# Patient Record
Sex: FEMALE | Race: WHITE | NOT HISPANIC OR LATINO | Employment: OTHER | URBAN - METROPOLITAN AREA
[De-identification: names, ages, dates, MRNs, and addresses within clinical notes are randomized per-mention and may not be internally consistent; named-entity substitution may affect disease eponyms.]

---

## 2017-01-04 ENCOUNTER — COMMUNICATION - HEALTHEAST (OUTPATIENT)
Dept: FAMILY MEDICINE | Facility: CLINIC | Age: 77
End: 2017-01-04

## 2017-01-04 DIAGNOSIS — I10 ESSENTIAL HYPERTENSION: ICD-10-CM

## 2017-02-03 ENCOUNTER — OFFICE VISIT - HEALTHEAST (OUTPATIENT)
Dept: FAMILY MEDICINE | Facility: CLINIC | Age: 77
End: 2017-02-03

## 2017-02-03 DIAGNOSIS — I10 ESSENTIAL HYPERTENSION WITH GOAL BLOOD PRESSURE LESS THAN 130/80: ICD-10-CM

## 2017-02-03 DIAGNOSIS — E11.9 TYPE 2 DIABETES MELLITUS (H): ICD-10-CM

## 2017-02-03 DIAGNOSIS — E78.5 HYPERLIPIDEMIA, UNSPECIFIED HYPERLIPIDEMIA TYPE: ICD-10-CM

## 2017-02-03 DIAGNOSIS — E66.09 NON MORBID OBESITY DUE TO EXCESS CALORIES: ICD-10-CM

## 2017-02-03 LAB
CHOLEST SERPL-MCNC: 152 MG/DL
FASTING STATUS PATIENT QL REPORTED: YES
HBA1C MFR BLD: 6.9 % (ref 3.5–6)
HDLC SERPL-MCNC: 36 MG/DL
LDLC SERPL CALC-MCNC: 57 MG/DL
TRIGL SERPL-MCNC: 296 MG/DL

## 2017-02-05 ENCOUNTER — AMBULATORY - HEALTHEAST (OUTPATIENT)
Dept: FAMILY MEDICINE | Facility: CLINIC | Age: 77
End: 2017-02-05

## 2017-02-05 ENCOUNTER — COMMUNICATION - HEALTHEAST (OUTPATIENT)
Dept: FAMILY MEDICINE | Facility: CLINIC | Age: 77
End: 2017-02-05

## 2017-02-05 DIAGNOSIS — E78.5 HYPERLIPIDEMIA, UNSPECIFIED HYPERLIPIDEMIA TYPE: ICD-10-CM

## 2017-02-08 ENCOUNTER — COMMUNICATION - HEALTHEAST (OUTPATIENT)
Dept: FAMILY MEDICINE | Facility: CLINIC | Age: 77
End: 2017-02-08

## 2017-05-04 ENCOUNTER — COMMUNICATION - HEALTHEAST (OUTPATIENT)
Dept: FAMILY MEDICINE | Facility: CLINIC | Age: 77
End: 2017-05-04

## 2017-05-04 DIAGNOSIS — E11.9 TYPE 2 DIABETES MELLITUS (H): ICD-10-CM

## 2017-08-03 ENCOUNTER — COMMUNICATION - HEALTHEAST (OUTPATIENT)
Dept: FAMILY MEDICINE | Facility: CLINIC | Age: 77
End: 2017-08-03

## 2017-08-03 DIAGNOSIS — E78.5 HYPERLIPIDEMIA, UNSPECIFIED HYPERLIPIDEMIA TYPE: ICD-10-CM

## 2017-08-03 RX ORDER — ATORVASTATIN CALCIUM 40 MG/1
TABLET, FILM COATED ORAL
Qty: 90 TABLET | Refills: 1 | Status: SHIPPED | OUTPATIENT
Start: 2017-08-03

## 2017-08-19 ENCOUNTER — COMMUNICATION - HEALTHEAST (OUTPATIENT)
Dept: FAMILY MEDICINE | Facility: CLINIC | Age: 77
End: 2017-08-19

## 2017-08-19 DIAGNOSIS — E11.9 TYPE 2 DIABETES MELLITUS (H): ICD-10-CM

## 2017-09-12 ENCOUNTER — RECORDS - HEALTHEAST (OUTPATIENT)
Dept: ADMINISTRATIVE | Facility: OTHER | Age: 77
End: 2017-09-12

## 2017-09-28 ENCOUNTER — COMMUNICATION - HEALTHEAST (OUTPATIENT)
Dept: FAMILY MEDICINE | Facility: CLINIC | Age: 77
End: 2017-09-28

## 2017-09-28 DIAGNOSIS — I10 ESSENTIAL HYPERTENSION: ICD-10-CM

## 2018-03-23 ENCOUNTER — COMMUNICATION - HEALTHEAST (OUTPATIENT)
Dept: FAMILY MEDICINE | Facility: CLINIC | Age: 78
End: 2018-03-23

## 2018-03-23 DIAGNOSIS — I10 ESSENTIAL HYPERTENSION: ICD-10-CM

## 2018-03-24 RX ORDER — LISINOPRIL 5 MG/1
TABLET ORAL
Qty: 90 TABLET | Refills: 0 | Status: SHIPPED | OUTPATIENT
Start: 2018-03-24 | End: 2024-01-29

## 2018-03-26 ENCOUNTER — COMMUNICATION - HEALTHEAST (OUTPATIENT)
Dept: FAMILY MEDICINE | Facility: CLINIC | Age: 78
End: 2018-03-26

## 2018-12-03 ENCOUNTER — COMMUNICATION - HEALTHEAST (OUTPATIENT)
Dept: FAMILY MEDICINE | Facility: CLINIC | Age: 78
End: 2018-12-03

## 2021-05-29 ENCOUNTER — RECORDS - HEALTHEAST (OUTPATIENT)
Dept: ADMINISTRATIVE | Facility: CLINIC | Age: 81
End: 2021-05-29

## 2021-05-30 VITALS — WEIGHT: 240 LBS | BODY MASS INDEX: 40.56 KG/M2

## 2021-06-08 NOTE — PROGRESS NOTES
Subjective:    Maritza Thomas is seen today for follow-up evaluation type 2 diabetes.  States can only be seen twice a year due to insurance coverage.  Continues metformin 500 mg using 2 capsules twice daily.  Lisinopril 5 mg daily for hypertension and renal protective effects.  Atorvastatin 20 mg daily for lipid management.  Needs refills.  Immunizations reviewed and up-to-date.  Fasting today.  Denies chest pain or shortness of breath.  No palpitations.  Past medical social and family history reviewed and updated as noted below.    /   5 children   Retired: worked as an  at [employer]..   Tobacco: none.   EtOH: occ beer or wine  Drugs: None   PMH: HTN. Allergic rhinitis. OA R shoulder  PSH: s/p appy; bilateral cataract repair (9-23-15 and 10-7-15)  FBS of 129 on July 24, 2009. Prior Hgb A1c = 6.2%. 07/26/2011   MAIL COLONSCOPY LETTER TO PATIENT/[facility]   Thyroid on CXR   Patient did not tolerate steroid nasal spray well    Past Surgical History:   Procedure Laterality Date     APPENDECTOMY          History reviewed. No pertinent family history.     Past Medical History:   Diagnosis Date     Diabetes mellitus      Hypertension         Social History   Substance Use Topics     Smoking status: Former Smoker     Smokeless tobacco: Never Used     Alcohol use 0.5 oz/week     1 drink(s) per week        Current Outpatient Prescriptions   Medication Sig Dispense Refill     aspirin 81 MG EC tablet Take 1 tablet (81 mg total) by mouth daily. 90 tablet 3     atorvastatin (LIPITOR) 20 MG tablet TAKE 1 TABLET (20 MG TOTAL) BY MOUTH BEDTIME. 90 tablet 3     lisinopril (PRINIVIL,ZESTRIL) 5 MG tablet TAKE 1 TABLET (5 MG) BY ORAL ROUTE ONCE DAILY 90 tablet 2     metFORMIN (GLUCOPHAGE) 500 MG tablet TAKE 2 TABLETS BY MOUTH 2 TIMES DAILY WITH MEALS. 180 tablet 2     PROAIR HFA 90 mcg/actuation inhaler INHALE TWO PUFFS BY MOUTH EVERY 4 TO 6 HOURS AS NEEDED - MAX OF 12 PUFFS IN 24 HOURS 8.5 g 5     No  current facility-administered medications for this visit.           Objective:    Vitals:    02/03/17 1321   BP: 130/72   Pulse: 80   Weight: (!) 240 lb (108.9 kg)      Body mass index is 40.56 kg/(m^2).    Alert.  No apparent distress.  Chest clear.  Cardiac exam regular.  Extremities warm and dry.  Monofilament testing normal.      Assessment:    1. Type 2 diabetes mellitus  Glycosylated Hemoglobin A1c    Glycosylated Hemoglobin A1c    Microalbumin, Random Urine   2. Essential hypertension with goal blood pressure less than 130/80  Basic Metabolic Panel   3. Hyperlipidemia, unspecified hyperlipidemia type  Lipid Cascade    atorvastatin (LIPITOR) 20 MG tablet   4. Non morbid obesity due to excess calories           Plan:    Ensure annual diabetic eye exam.  A1c increased from 6.1% to 6.9%.  Dietary and exercise modifications for [place] less than 200 pounds initially.  Check basic metabolic panel to ensure stable renal function.  Lipid cascade obtained while on atorvastatin 20 mg daily with refill provided.  Check microalbumin screen.  Patient elects follow-up in 6 months.  Immunizations reviewed and up-to-date.

## 2024-01-29 ENCOUNTER — HOSPITAL ENCOUNTER (INPATIENT)
Facility: CLINIC | Age: 84
LOS: 7 days | Discharge: HOME | DRG: 308 | End: 2024-02-05
Attending: EMERGENCY MEDICINE | Admitting: STUDENT IN AN ORGANIZED HEALTH CARE EDUCATION/TRAINING PROGRAM
Payer: COMMERCIAL

## 2024-01-29 ENCOUNTER — APPOINTMENT (OUTPATIENT)
Dept: CT IMAGING | Facility: CLINIC | Age: 84
DRG: 308 | End: 2024-01-29
Attending: EMERGENCY MEDICINE
Payer: COMMERCIAL

## 2024-01-29 DIAGNOSIS — I27.20 PULMONARY HYPERTENSION (H): ICD-10-CM

## 2024-01-29 DIAGNOSIS — I35.0 NONRHEUMATIC AORTIC VALVE STENOSIS: Primary | ICD-10-CM

## 2024-01-29 DIAGNOSIS — I47.10 PAROXYSMAL SVT (SUPRAVENTRICULAR TACHYCARDIA) (H): ICD-10-CM

## 2024-01-29 DIAGNOSIS — I48.91 ATRIAL FIBRILLATION WITH RVR (H): ICD-10-CM

## 2024-01-29 DIAGNOSIS — J81.0 ACUTE PULMONARY EDEMA (H): ICD-10-CM

## 2024-01-29 DIAGNOSIS — J96.01 ACUTE HYPOXEMIC RESPIRATORY FAILURE (H): ICD-10-CM

## 2024-01-29 PROBLEM — J45.20 MILD INTERMITTENT ASTHMA WITHOUT COMPLICATION: Status: ACTIVE | Noted: 2019-05-01

## 2024-01-29 PROBLEM — I47.19 MULTIFOCAL ATRIAL TACHYCARDIA (H): Status: ACTIVE | Noted: 2023-06-20

## 2024-01-29 PROBLEM — R01.1 HEART MURMUR: Status: ACTIVE | Noted: 2022-12-03

## 2024-01-29 LAB
ANION GAP SERPL CALCULATED.3IONS-SCNC: 14 MMOL/L (ref 7–15)
ATRIAL RATE - MUSE: 166 BPM
BUN SERPL-MCNC: 13 MG/DL (ref 8–23)
CALCIUM SERPL-MCNC: 9.8 MG/DL (ref 8.8–10.2)
CHLORIDE SERPL-SCNC: 103 MMOL/L (ref 98–107)
CREAT SERPL-MCNC: 0.73 MG/DL (ref 0.51–0.95)
CREAT SERPL-MCNC: 0.76 MG/DL (ref 0.51–0.95)
DEPRECATED HCO3 PLAS-SCNC: 24 MMOL/L (ref 22–29)
DIASTOLIC BLOOD PRESSURE - MUSE: 100 MMHG
EGFRCR SERPLBLD CKD-EPI 2021: 76 ML/MIN/1.73M2
EGFRCR SERPLBLD CKD-EPI 2021: 80 ML/MIN/1.73M2
ERYTHROCYTE [DISTWIDTH] IN BLOOD BY AUTOMATED COUNT: 14.8 % (ref 10–15)
FLUAV RNA SPEC QL NAA+PROBE: NEGATIVE
FLUBV RNA RESP QL NAA+PROBE: NEGATIVE
GLUCOSE SERPL-MCNC: 208 MG/DL (ref 70–99)
HBA1C MFR BLD: 7.3 %
HCT VFR BLD AUTO: 37.1 % (ref 35–47)
HGB BLD-MCNC: 11.7 G/DL (ref 11.7–15.7)
INR PPP: 1.22 (ref 0.85–1.15)
INTERPRETATION ECG - MUSE: NORMAL
MAGNESIUM SERPL-MCNC: 1.8 MG/DL (ref 1.7–2.3)
MCH RBC QN AUTO: 30.2 PG (ref 26.5–33)
MCHC RBC AUTO-ENTMCNC: 31.5 G/DL (ref 31.5–36.5)
MCV RBC AUTO: 96 FL (ref 78–100)
NT-PROBNP SERPL-MCNC: 1076 PG/ML (ref 0–1800)
P AXIS - MUSE: NORMAL DEGREES
PLATELET # BLD AUTO: 177 10E3/UL (ref 150–450)
POTASSIUM SERPL-SCNC: 4.2 MMOL/L (ref 3.4–5.3)
PR INTERVAL - MUSE: NORMAL MS
QRS DURATION - MUSE: 70 MS
QT - MUSE: 250 MS
QTC - MUSE: 426 MS
R AXIS - MUSE: 1 DEGREES
RBC # BLD AUTO: 3.87 10E6/UL (ref 3.8–5.2)
RSV RNA SPEC NAA+PROBE: NEGATIVE
SARS-COV-2 RNA RESP QL NAA+PROBE: NEGATIVE
SODIUM SERPL-SCNC: 141 MMOL/L (ref 135–145)
SYSTOLIC BLOOD PRESSURE - MUSE: 173 MMHG
T AXIS - MUSE: 94 DEGREES
TROPONIN T SERPL HS-MCNC: 22 NG/L
TROPONIN T SERPL HS-MCNC: 24 NG/L
TSH SERPL DL<=0.005 MIU/L-ACNC: 1 UIU/ML (ref 0.3–4.2)
VENTRICULAR RATE- MUSE: 175 BPM
WBC # BLD AUTO: 7.8 10E3/UL (ref 4–11)

## 2024-01-29 PROCEDURE — 96376 TX/PRO/DX INJ SAME DRUG ADON: CPT

## 2024-01-29 PROCEDURE — 84484 ASSAY OF TROPONIN QUANT: CPT | Performed by: EMERGENCY MEDICINE

## 2024-01-29 PROCEDURE — 36415 COLL VENOUS BLD VENIPUNCTURE: CPT | Performed by: EMERGENCY MEDICINE

## 2024-01-29 PROCEDURE — 96365 THER/PROPH/DIAG IV INF INIT: CPT | Mod: 59

## 2024-01-29 PROCEDURE — 250N000011 HC RX IP 250 OP 636

## 2024-01-29 PROCEDURE — 82565 ASSAY OF CREATININE: CPT

## 2024-01-29 PROCEDURE — 83735 ASSAY OF MAGNESIUM: CPT | Performed by: EMERGENCY MEDICINE

## 2024-01-29 PROCEDURE — 83036 HEMOGLOBIN GLYCOSYLATED A1C: CPT

## 2024-01-29 PROCEDURE — 85610 PROTHROMBIN TIME: CPT | Performed by: EMERGENCY MEDICINE

## 2024-01-29 PROCEDURE — 71275 CT ANGIOGRAPHY CHEST: CPT

## 2024-01-29 PROCEDURE — 85027 COMPLETE CBC AUTOMATED: CPT | Performed by: EMERGENCY MEDICINE

## 2024-01-29 PROCEDURE — 83880 ASSAY OF NATRIURETIC PEPTIDE: CPT | Performed by: EMERGENCY MEDICINE

## 2024-01-29 PROCEDURE — 250N000013 HC RX MED GY IP 250 OP 250 PS 637

## 2024-01-29 PROCEDURE — 87637 SARSCOV2&INF A&B&RSV AMP PRB: CPT | Performed by: EMERGENCY MEDICINE

## 2024-01-29 PROCEDURE — 93005 ELECTROCARDIOGRAM TRACING: CPT | Performed by: EMERGENCY MEDICINE

## 2024-01-29 PROCEDURE — 84443 ASSAY THYROID STIM HORMONE: CPT | Performed by: EMERGENCY MEDICINE

## 2024-01-29 PROCEDURE — 250N000011 HC RX IP 250 OP 636: Performed by: EMERGENCY MEDICINE

## 2024-01-29 PROCEDURE — 80048 BASIC METABOLIC PNL TOTAL CA: CPT | Performed by: EMERGENCY MEDICINE

## 2024-01-29 PROCEDURE — 210N000002 HC R&B HEART CARE

## 2024-01-29 PROCEDURE — 99291 CRITICAL CARE FIRST HOUR: CPT | Mod: 25

## 2024-01-29 RX ORDER — MULTIVIT WITH MINERALS/LUTEIN
1 TABLET ORAL DAILY
COMMUNITY

## 2024-01-29 RX ORDER — VITAMIN B COMPLEX
25 TABLET ORAL DAILY
COMMUNITY

## 2024-01-29 RX ORDER — CALCIUM CARBONATE 500 MG/1
1000 TABLET, CHEWABLE ORAL 4 TIMES DAILY PRN
Status: DISCONTINUED | OUTPATIENT
Start: 2024-01-29 | End: 2024-02-05 | Stop reason: HOSPADM

## 2024-01-29 RX ORDER — FUROSEMIDE 10 MG/ML
40 INJECTION INTRAMUSCULAR; INTRAVENOUS ONCE
Status: COMPLETED | OUTPATIENT
Start: 2024-01-29 | End: 2024-01-29

## 2024-01-29 RX ORDER — AMOXICILLIN 250 MG
2 CAPSULE ORAL 2 TIMES DAILY PRN
Status: DISCONTINUED | OUTPATIENT
Start: 2024-01-29 | End: 2024-02-05 | Stop reason: HOSPADM

## 2024-01-29 RX ORDER — FUROSEMIDE 10 MG/ML
40 INJECTION INTRAMUSCULAR; INTRAVENOUS ONCE
Status: DISCONTINUED | OUTPATIENT
Start: 2024-01-29 | End: 2024-01-29

## 2024-01-29 RX ORDER — AMOXICILLIN 250 MG
1 CAPSULE ORAL 2 TIMES DAILY PRN
Status: DISCONTINUED | OUTPATIENT
Start: 2024-01-29 | End: 2024-02-05 | Stop reason: HOSPADM

## 2024-01-29 RX ORDER — DILTIAZEM HYDROCHLORIDE 5 MG/ML
15 INJECTION INTRAVENOUS ONCE
Status: COMPLETED | OUTPATIENT
Start: 2024-01-29 | End: 2024-01-29

## 2024-01-29 RX ORDER — FLUTICASONE PROPIONATE 50 MCG
1 SPRAY, SUSPENSION (ML) NASAL 2 TIMES DAILY
COMMUNITY

## 2024-01-29 RX ORDER — IOPAMIDOL 755 MG/ML
90 INJECTION, SOLUTION INTRAVASCULAR ONCE
Status: COMPLETED | OUTPATIENT
Start: 2024-01-29 | End: 2024-01-29

## 2024-01-29 RX ORDER — METFORMIN HCL 500 MG
500 TABLET, EXTENDED RELEASE 24 HR ORAL 2 TIMES DAILY WITH MEALS
COMMUNITY

## 2024-01-29 RX ORDER — DILTIAZEM HYDROCHLORIDE 5 MG/ML
25 INJECTION INTRAVENOUS ONCE
Status: COMPLETED | OUTPATIENT
Start: 2024-01-29 | End: 2024-01-29

## 2024-01-29 RX ORDER — METFORMIN HCL 500 MG
500 TABLET, EXTENDED RELEASE 24 HR ORAL 2 TIMES DAILY WITH MEALS
Status: DISCONTINUED | OUTPATIENT
Start: 2024-01-29 | End: 2024-02-05 | Stop reason: HOSPADM

## 2024-01-29 RX ORDER — LIDOCAINE 40 MG/G
CREAM TOPICAL
Status: DISCONTINUED | OUTPATIENT
Start: 2024-01-29 | End: 2024-02-05 | Stop reason: HOSPADM

## 2024-01-29 RX ORDER — FUROSEMIDE 10 MG/ML
40 INJECTION INTRAMUSCULAR; INTRAVENOUS ONCE
Status: COMPLETED | OUTPATIENT
Start: 2024-01-30 | End: 2024-01-30

## 2024-01-29 RX ORDER — ACETAMINOPHEN 650 MG/1
650 SUPPOSITORY RECTAL EVERY 4 HOURS PRN
Status: DISCONTINUED | OUTPATIENT
Start: 2024-01-29 | End: 2024-02-05 | Stop reason: HOSPADM

## 2024-01-29 RX ORDER — ACETAMINOPHEN 325 MG/1
650 TABLET ORAL EVERY 4 HOURS PRN
Status: DISCONTINUED | OUTPATIENT
Start: 2024-01-29 | End: 2024-02-05 | Stop reason: HOSPADM

## 2024-01-29 RX ORDER — ENOXAPARIN SODIUM 100 MG/ML
40 INJECTION SUBCUTANEOUS EVERY 24 HOURS
Status: DISCONTINUED | OUTPATIENT
Start: 2024-01-29 | End: 2024-01-29

## 2024-01-29 RX ORDER — DILTIAZEM HCL/D5W 125 MG/125
5-15 PLASTIC BAG, INJECTION (ML) INTRAVENOUS CONTINUOUS
Status: DISCONTINUED | OUTPATIENT
Start: 2024-01-29 | End: 2024-02-05

## 2024-01-29 RX ORDER — DILTIAZEM HYDROCHLORIDE 5 MG/ML
20 INJECTION INTRAVENOUS ONCE
Status: COMPLETED | OUTPATIENT
Start: 2024-01-29 | End: 2024-01-29

## 2024-01-29 RX ORDER — LOSARTAN POTASSIUM 50 MG/1
50 TABLET ORAL EVERY EVENING
COMMUNITY

## 2024-01-29 RX ORDER — ATORVASTATIN CALCIUM 40 MG/1
40 TABLET, FILM COATED ORAL EVERY EVENING
Status: DISCONTINUED | OUTPATIENT
Start: 2024-01-29 | End: 2024-02-05 | Stop reason: HOSPADM

## 2024-01-29 RX ORDER — UBIDECARENONE 75 MG
100 CAPSULE ORAL DAILY
COMMUNITY

## 2024-01-29 RX ORDER — UBIDECARENONE 75 MG
100 CAPSULE ORAL DAILY
Status: DISCONTINUED | OUTPATIENT
Start: 2024-01-30 | End: 2024-02-05 | Stop reason: HOSPADM

## 2024-01-29 RX ORDER — HEPARIN SODIUM 5000 [USP'U]/.5ML
5000 INJECTION, SOLUTION INTRAVENOUS; SUBCUTANEOUS EVERY 8 HOURS
Status: DISCONTINUED | OUTPATIENT
Start: 2024-01-30 | End: 2024-01-30

## 2024-01-29 RX ADMIN — METFORMIN ER 500 MG 500 MG: 500 TABLET ORAL at 19:36

## 2024-01-29 RX ADMIN — DILTIAZEM HYDROCHLORIDE 15 MG: 5 INJECTION INTRAVENOUS at 15:23

## 2024-01-29 RX ADMIN — FUROSEMIDE 40 MG: 10 INJECTION, SOLUTION INTRAMUSCULAR; INTRAVENOUS at 17:49

## 2024-01-29 RX ADMIN — Medication 5 MG/HR: at 16:32

## 2024-01-29 RX ADMIN — DILTIAZEM HYDROCHLORIDE 20 MG: 5 INJECTION INTRAVENOUS at 16:26

## 2024-01-29 RX ADMIN — ENOXAPARIN SODIUM 40 MG: 100 INJECTION SUBCUTANEOUS at 19:30

## 2024-01-29 RX ADMIN — DILTIAZEM HYDROCHLORIDE 25 MG: 5 INJECTION INTRAVENOUS at 18:30

## 2024-01-29 RX ADMIN — IOPAMIDOL 90 ML: 755 INJECTION, SOLUTION INTRAVENOUS at 16:11

## 2024-01-29 RX ADMIN — ATORVASTATIN CALCIUM 40 MG: 40 TABLET, FILM COATED ORAL at 19:30

## 2024-01-29 ASSESSMENT — ACTIVITIES OF DAILY LIVING (ADL)
ADLS_ACUITY_SCORE: 37
ADLS_ACUITY_SCORE: 35
ADLS_ACUITY_SCORE: 37

## 2024-01-29 NOTE — PHARMACY-ADMISSION MEDICATION HISTORY
Pharmacist Admission Medication History    Admission medication history is complete. The information provided in this note is only as accurate as the sources available at the time of the update.    Information Source(s): Patient and CareEverywhere/SureScripts via in-person    Pertinent Information: n/a    Changes made to PTA medication list:  Added: Centrum, Flonase, Vit D, Vit B12, losartan  Deleted: lisinopril  Changed: metformin from 1000mg BID    Allergies reviewed with patient and updates made in EHR: yes    Medication History Completed By: Cristina Melo PharmD 1/29/2024 4:24 PM    Prior to Admission medications    Medication Sig Last Dose Taking? Auth Provider Long Term End Date   atorvastatin (LIPITOR) 40 MG tablet [ATORVASTATIN (LIPITOR) 40 MG TABLET] TAKE ONE TABLET BY MOUTH DAILY AT BEDTIME 1/28/2024 at hs Yes Cristofer Ramirez MD Yes    cyanocobalamin (VITAMIN B-12) 100 MCG tablet Take 100 mcg by mouth daily 1/29/2024 at am Yes Unknown, Entered By History     fluticasone (FLONASE) 50 MCG/ACT nasal spray Spray 1 spray into both nostrils 2 times daily 1/29/2024 at am x1, not with Yes Unknown, Entered By History     losartan (COZAAR) 50 MG tablet Take 50 mg by mouth every evening 1/28/2024 at pm Yes Unknown, Entered By History Yes    metFORMIN (GLUCOPHAGE XR) 500 MG 24 hr tablet Take 500 mg by mouth 2 times daily (with meals) 1/29/2024 at am x1 Yes Unknown, Entered By History No    multivitamin (CENTRUM SILVER) tablet Take 1 tablet by mouth daily 1/29/2024 at am Yes Unknown, Entered By History     PROAIR HFA 90 mcg/actuation inhaler [PROAIR HFA 90 MCG/ACTUATION INHALER] INHALE TWO PUFFS BY MOUTH EVERY 4 TO 6 HOURS AS NEEDED - MAX OF 12 PUFFS IN 24 HOURS 1/29/2024 at 1300, has with Yes Cristofer Ramirez MD     Vitamin D3 (CHOLECALCIFEROL) 25 mcg (1000 units) tablet Take 25 mcg by mouth daily 1/29/2024 at am Yes Unknown, Entered By History

## 2024-01-29 NOTE — ED PROVIDER NOTES
EMERGENCY DEPARTMENT ENCOUNTER      NAME: Maritza Thomas  AGE: 83 year old female  YOB: 1940  MRN: [id number]  EVALUATION DATE & TIME: 1/29/2024  2:48 PM    PCP: Cristofer Ramirez    ED PROVIDER: Grace Scherer MD    Chief Complaint   Patient presents with    Shortness of Breath    Tachycardia         FINAL IMPRESSION:  1. Atrial fibrillation with RVR (H)    2. Acute pulmonary edema (H)    3. Acute hypoxemic respiratory failure (H)          ED COURSE & MEDICAL DECISION MAKING:    Pertinent Labs & Imaging studies reviewed. (See chart for details)  83 year old female with history of HTN, HLD, DM, asthma and paroxysmal SVT who presents to the Emergency Department for evaluation of shortness of breath.  Found to be in A-fib with RVR and hypoxic at clinic prior to arrival.  Differential includes new onset arrhythmia, symptomatic anemia, ACS, pneumonia, pulmonary embolism, new onset heart failure.    Patient placed on monitor, IV established and blood obtained.  Twelve-lead EKG shows A-fib with RVR.  Patient is tachycardic in the 180s.  Given 15 mg of diltiazem IV with improvement of rates into the 150s.  Given additional 20 mg diltiazem IV and placed on diltiazem drip.  COVID/influenza/RSV swab negative.  CBC and BMP notable for blood glucose of 208.  BNP normal at 1076.  Troponin elevated in indeterminate range at 24 but on repeat has down trended to 22.  INR is 1.2, again patient states that she is on Coumadin but I do not find record of this nor does her pharmacist and regardless her INR is subtherapeutic today so did proceed with CT PE study.  Negative for PE.  Shows enlargement of the pulmonary artery which could be seen with pulmonary hypertension.  Findings suggestive of pulmonary edema with right and left pleural effusions.  There is also a possible thrombus versus mixing artifact in the left atrial appendage as well as incidental thyroid goiter and cirrhotic appearing liver.  Given 40 mg of Lasix  IV as she appears to be diuretic naïve and will be admitted to medicine for further management      ED Course as of 01/29/24 2057 Mon Jan 29, 2024   1541 The 150s to 160s after 15 mg diltiazem   1545 INR(!): 1.22  Sub therapeutic    1555 Troponin T, High Sensitivity(!): 24   1555 Glucose(!): 208  Known DM   1619 CT Chest Pulmonary Embolism w Contrast  CT interpreted by myself.  No visualized PE but right-sided pleural effusion   1658 Admitted to resident service       Medical Decision Making    History:  Supplemental history from: Other: Physician who called ahead with patient information  External Record(s) reviewed: Outpatient Record: Clinic visit note today and Other: Previous EKG    Work Up:  Chart documentation includes differential considered and any EKGs or imaging independently interpreted by provider, see MDM  In additional to work up documented, I considered the following work up: see MDM    External consultation:  Discussion of management with another provider: Admitting physician    Complicating factors:  Care impacted by chronic illness: Chronic Lung Disease, Hyperlipidemia, and Hypertension  Care affected by social determinants of health: Access to Medical Care referred to ED    Disposition considerations: Admit.        At the conclusion of the encounter I discussed the results of all of the tests and the disposition. The questions were answered. The patient or family acknowledged understanding and was agreeable with the care plan.    CRITICAL CARE:  Critical Care  Performed by: Grace Scherer MD  Authorized by: Grace Scherer MD     Total critical care time: 67 minutes  Criticalcare time was exclusive of separately billable procedures and treating other patients.  Critical care was necessary to treat or prevent imminent or life-threatening deterioration of the following conditions: A-fib with RVR, acute hypoxic respiratory failure  Critical care was time spent personally by me on the following  activities: development of treatment plan with patient or surrogate, discussions with consultants, examination of patient, evaluation of patient's response totreatment, obtaining history from patient or surrogate, ordering and performing treatments and interventions, ordering and review of laboratory studies, ordering and review of radiographic studies and re-evaluation ofpatient's condition, this excludes any separately billable procedures.      MEDICATIONS GIVEN IN THE EMERGENCY:  Medications   diltiazem (CARDIZEM) 125 mg in dextrose 5 % 125 mL infusion (15 mg/hr Intravenous Rate/Dose Verify 1/29/24 2039)   lidocaine 1 % 0.1-1 mL (has no administration in time range)   lidocaine (LMX4) cream (has no administration in time range)   sodium chloride (PF) 0.9% PF flush 3 mL (3 mLs Intracatheter $Given 1/29/24 1930)   sodium chloride (PF) 0.9% PF flush 3 mL (has no administration in time range)   senna-docusate (SENOKOT-S/PERICOLACE) 8.6-50 MG per tablet 1 tablet (has no administration in time range)     Or   senna-docusate (SENOKOT-S/PERICOLACE) 8.6-50 MG per tablet 2 tablet (has no administration in time range)   calcium carbonate (TUMS) chewable tablet 1,000 mg (has no administration in time range)   acetaminophen (TYLENOL) tablet 650 mg (has no administration in time range)     Or   acetaminophen (TYLENOL) Suppository 650 mg (has no administration in time range)   atorvastatin (LIPITOR) tablet 40 mg (40 mg Oral $Given 1/29/24 1930)   cyanocobalamin (VITAMIN B-12) tablet 100 mcg (has no administration in time range)   metFORMIN (GLUCOPHAGE XR) 24 hr tablet 500 mg (500 mg Oral $Given 1/29/24 1936)   senna-docusate (SENOKOT-S/PERICOLACE) 8.6-50 MG per tablet 1 tablet (has no administration in time range)     Or   senna-docusate (SENOKOT-S/PERICOLACE) 8.6-50 MG per tablet 2 tablet (has no administration in time range)   calcium carbonate (TUMS) chewable tablet 1,000 mg (has no administration in time range)   heparin  "ANTICOAGULANT injection 5,000 Units (has no administration in time range)   diltiazem (CARDIZEM) injection 15 mg (15 mg Intravenous $Given 1/29/24 1523)   diltiazem (CARDIZEM) injection 20 mg (20 mg Intravenous $Given 1/29/24 1626)   iopamidol (ISOVUE-370) solution 90 mL (90 mLs Intravenous $Given 1/29/24 1611)   furosemide (LASIX) injection 40 mg (40 mg Intravenous $Given 1/29/24 1749)   diltiazem (CARDIZEM) injection 25 mg (25 mg Intravenous $Given 1/29/24 1830)       NEW PRESCRIPTIONS STARTED AT TODAY'S ER VISIT  New Prescriptions    No medications on file          =================================================================    HPI    Patient information was obtained from: Patient    Use of Intrepreter: N/A      Maritza Thomas is a 83 year old female with pertinent medical history of HTN, HLD, DM, asthma and paroxysmal SVT who presents shortness of breath.  She has been short of breath for the last several days but had an upcoming clinic appointment for diabetic check.  Today at clinic she was found to be in A-fib with RVR with heart rates in the 180s and was hypoxic and referred here.  Patient does not have any known history of A-fib.  She denies any sense of palpitations like her heart is racing or skipping beats.  No associated chest pain, patient attributes her dyspnea to a \"sinus infection\" stating that she has been having some postnasal drip and she will cough this up in the morning and then gets relief with no residual cough for the remainder of the day.  Denies any known history of heart failure, weight gain.  Patient initially tells me she is anticoagulated on Coumadin, but I do not see an indication for her anticoagulation status nor do I see this on her med list nor does the pharmacist.  She denies any history of DVT, PE.      PAST MEDICAL HISTORY:  No past medical history on file.    PAST SURGICAL HISTORY:  Past Surgical History:   Procedure Laterality Date    APPENDECTOMY         CURRENT " MEDICATIONS:    Prior to Admission Medications   Prescriptions Last Dose Informant Patient Reported? Taking?   PROAIR HFA 90 mcg/actuation inhaler 1/29/2024 at 1300, has with  No Yes   Sig: [PROAIR HFA 90 MCG/ACTUATION INHALER] INHALE TWO PUFFS BY MOUTH EVERY 4 TO 6 HOURS AS NEEDED - MAX OF 12 PUFFS IN 24 HOURS   Vitamin D3 (CHOLECALCIFEROL) 25 mcg (1000 units) tablet 1/29/2024 at am  Yes Yes   Sig: Take 25 mcg by mouth daily   atorvastatin (LIPITOR) 40 MG tablet 1/28/2024 at hs  No Yes   Sig: [ATORVASTATIN (LIPITOR) 40 MG TABLET] TAKE ONE TABLET BY MOUTH DAILY AT BEDTIME   cyanocobalamin (VITAMIN B-12) 100 MCG tablet 1/29/2024 at am  Yes Yes   Sig: Take 100 mcg by mouth daily   fluticasone (FLONASE) 50 MCG/ACT nasal spray 1/29/2024 at am x1, not with  Yes Yes   Sig: Spray 1 spray into both nostrils 2 times daily   losartan (COZAAR) 50 MG tablet 1/28/2024 at pm  Yes Yes   Sig: Take 50 mg by mouth every evening   metFORMIN (GLUCOPHAGE XR) 500 MG 24 hr tablet 1/29/2024 at am x1  Yes Yes   Sig: Take 500 mg by mouth 2 times daily (with meals)   multivitamin (CENTRUM SILVER) tablet 1/29/2024 at am  Yes Yes   Sig: Take 1 tablet by mouth daily      Facility-Administered Medications: None       ALLERGIES:  No Known Allergies    FAMILY HISTORY:  No family history on file.    SOCIAL HISTORY:  Social History     Tobacco Use    Smoking status: Former    Smokeless tobacco: Never   Substance Use Topics    Alcohol use: Yes     Alcohol/week: 0.8 standard drinks of alcohol    Drug use: No        VITALS:  Patient Vitals for the past 24 hrs:   BP Temp Temp src Pulse Resp SpO2 Height Weight   01/29/24 2000 137/64 97.9  F (36.6  C) -- 111 22 93 % -- --   01/29/24 1930 125/78 -- -- 115 (!) 31 90 % -- --   01/29/24 1915 122/69 98  F (36.7  C) Oral 114 26 94 % -- --   01/29/24 1910 -- -- -- -- -- (!) 88 % -- --   01/29/24 1900 122/69 -- -- 99 22 92 % -- --   01/29/24 1840 -- -- -- 99 -- 92 % -- --   01/29/24 1839 115/87 -- -- 91 -- -- --  "--   01/29/24 1831 118/81 -- -- (!) 151 29 95 % -- --   01/29/24 1810 (!) 143/93 -- -- (!) 153 -- 93 % -- --   01/29/24 1800 -- -- -- (!) 157 (!) 42 93 % -- --   01/29/24 1753 -- -- -- (!) 150 -- 95 % -- --   01/29/24 1731 (!) 140/61 -- -- (!) 145 -- 93 % -- --   01/29/24 1716 130/74 -- -- (!) 148 -- -- -- --   01/29/24 1700 121/67 -- -- (!) 135 26 -- -- --   01/29/24 1659 -- -- -- (!) 142 (!) 33 90 % -- --   01/29/24 1646 117/76 -- -- (!) 140 25 -- -- --   01/29/24 1636 (!) 112/97 -- -- (!) 124 -- 90 % -- --   01/29/24 1630 104/82 -- -- (!) 154 -- 91 % -- --   01/29/24 1627 110/81 -- -- (!) 163 25 92 % -- --   01/29/24 1601 (!) 171/79 -- -- (!) 158 -- 92 % -- --   01/29/24 1546 (!) 153/68 -- -- (!) 158 -- 93 % -- --   01/29/24 1532 -- -- -- -- -- 91 % -- --   01/29/24 1531 (!) 163/73 -- -- (!) 156 26 -- -- --   01/29/24 1518 -- -- -- (!) 184 24 93 % -- --   01/29/24 1517 (!) 144/110 -- -- (!) 181 -- 94 % -- --   01/29/24 1441 -- 97.2  F (36.2  C) Temporal -- -- -- -- --   01/29/24 1439 119/82 -- -- (!) 162 20 92 % 1.651 m (5' 5\") 99.8 kg (220 lb)   01/29/24 1436 -- -- -- (!) 188 -- (!) 87 % -- --       PHYSICAL EXAM    General Appearance: Well-appearing, well-nourished, no acute distress   Head:  Normocephalic, atraumatic  Eyes:  PERRL, conjunctiva/corneas clear, EOM's intact  ENT:  membranes are moist without pallor  Neck:  Supple, symmetrical, trachea midline, no adenopathy  Cardio: Tachycardic with irregularly irregular rhythm in the 180s, no appreciable murmur  Pulm:  No respiratory distress, clear to auscultation bilaterally  Abdomen:  Soft, non-tender, non distended,no rebound or guarding.  Extremities: No appreciable pedal edema  Skin:  Skin warm, dry, no rashes  Neuro:  Alert and oriented ×3     RADIOLOGY/LABS:  Reviewed all pertinent imaging. Please see official radiology report. All pertinent labs reviewed and interpreted.    Results for orders placed or performed during the hospital encounter of 01/29/24 "   CT Chest Pulmonary Embolism w Contrast    Impression    IMPRESSION:  1.  No pulmonary embolism.  2.  Enlargement main pulmonary which can be seen with pulmonary hypertension.  3.  Hypoattenuation of the left atrial appendage. While this could represent contrast mixing artifact, a echocardiogram is recommended to exclude thrombus.  4.  Findings suggesting cardiogenic pulmonary edema with moderate right and small left pleural effusions.  5.  Large thyroid goiter with intrathoracic extension. Recommend thyroid ultrasound for further characterization.  6.  Cirrhotic liver morphology. Consider nonemergent right upper quadrant abdominal ultrasound for further characterization.   CBC (+ platelets, no diff)   Result Value Ref Range    WBC Count 7.8 4.0 - 11.0 10e3/uL    RBC Count 3.87 3.80 - 5.20 10e6/uL    Hemoglobin 11.7 11.7 - 15.7 g/dL    Hematocrit 37.1 35.0 - 47.0 %    MCV 96 78 - 100 fL    MCH 30.2 26.5 - 33.0 pg    MCHC 31.5 31.5 - 36.5 g/dL    RDW 14.8 10.0 - 15.0 %    Platelet Count 177 150 - 450 10e3/uL   Basic metabolic panel   Result Value Ref Range    Sodium 141 135 - 145 mmol/L    Potassium 4.2 3.4 - 5.3 mmol/L    Chloride 103 98 - 107 mmol/L    Carbon Dioxide (CO2) 24 22 - 29 mmol/L    Anion Gap 14 7 - 15 mmol/L    Urea Nitrogen 13.0 8.0 - 23.0 mg/dL    Creatinine 0.76 0.51 - 0.95 mg/dL    GFR Estimate 76 >60 mL/min/1.73m2    Calcium 9.8 8.8 - 10.2 mg/dL    Glucose 208 (H) 70 - 99 mg/dL   Troponin T, High Sensitivity (now)   Result Value Ref Range    Troponin T, High Sensitivity 24 (H) <=14 ng/L   TSH with free T4 reflex   Result Value Ref Range    TSH 1.00 0.30 - 4.20 uIU/mL   Result Value Ref Range    Magnesium 1.8 1.7 - 2.3 mg/dL   Result Value Ref Range    INR 1.22 (H) 0.85 - 1.15   Symptomatic Influenza A/B, RSV, & SARS-CoV2 PCR (COVID-19) Nasopharyngeal    Specimen: Nasopharyngeal; Swab   Result Value Ref Range    Influenza A PCR Negative Negative    Influenza B PCR Negative Negative    RSV PCR  Negative Negative    SARS CoV2 PCR Negative Negative   Result Value Ref Range    Troponin T, High Sensitivity 22 (H) <=14 ng/L   N terminal pro BNP outpatient   Result Value Ref Range    N Terminal Pro BNP Outpatient 1,076 0 - 1,800 pg/mL   Creatinine   Result Value Ref Range    Creatinine 0.73 0.51 - 0.95 mg/dL    GFR Estimate 80 >60 mL/min/1.73m2   ECG 12-LEAD WITH MUSE ([facility])   Result Value Ref Range    Systolic Blood Pressure 173 mmHg    Diastolic Blood Pressure 100 mmHg    Ventricular Rate 175 BPM    Atrial Rate 166 BPM    FL Interval  ms    QRS Duration 70 ms     ms    QTc 426 ms    P Axis  degrees    R AXIS 1 degrees    T Axis 94 degrees    Interpretation ECG       ** Poor data quality, interpretation may be adversely affected  Atrial fibrillation with rapid ventricular response  Low voltage QRS  Cannot rule out Anteroseptal infarct (cited on or before 19-MAY-2006)  Abnormal ECG  When compared with ECG of 19-MAY-2006 13:20,  Atrial fibrillation has replaced Sinus rhythm  Vent. rate has increased BY  93 BPM  Questionable change in initial forces of Anteroseptal leads  T wave inversion no longer evident in Lateral leads  Confirmed by SEE ED PROVIDER NOTE FOR, ECG INTERPRETATION ([id number]),  LEATHA DELUNA ([id number]) on 1/29/2024 2:46:17 PM         EKG:  A-fib with RVR rate 175.  No notable ST or T wave abnormalities.  QRS 70 QTc 426.  Compared to previous EKG from 5/19/2006 A-fib has replaced sinus rhythm  I have independently reviewed and interpreted the EKG(s) documented above.        Grace Scherer MD  Emergency Medicine  [facility] EMERGENCY ROOM  [address] [address] [phone]  Dept: [phone]     Grace Scherer MD  01/29/24 2057

## 2024-01-29 NOTE — H&P
[facility]    History and Physical - Hospitalist Service       Date of Admission:  1/29/2024    Assessment & Plan      Maritza Thomas is a 83 year old female who has a history of HTN, T2DM, asthma and is admitted on 1/29/2024 for afib with RVR.    Acute hypoxic respiratory failure  A-fib with RVR  Bilateral pleural effusions  Patient presenting to ED for shortness of breath, was recommended to go to the ED at routine office visit today where she was found to be mildly hypoxic on RA, EKG was done showing new A-fib with RVR.  Presented afebrile, initial , BP stable.  Satting 87% on RA, satting mid 90s percent on 4L O2 via NC. BMP wnl, proBNP 1076.  TSH normal.  Glucose 208.  High-sensitivity troponin 24 and 22 on repeat.  CBC wnl.  INR 1.22.  Negative for COVID, influenza A/B, RSV.  EKG showing A-fib with RVR.  CT PE without PE though is showing moderate right and small left pleural effusions.  In the ED received 40 mg IV Lasix and responding with increased urination.  Received bolus of diltiazem and started on drip, maxed out.  After additional bolus of diltiazem HR now normalized with normal BP.  Patient appears hypervolemic on exam and history also more consistent with orthopnea and new heart failure.  Did have expiratory wheezing on exam and cough, considered asthma exacerbation however patient reported inhaler was not helpful. Discussed with cardiology.  -Admit inpatient  -Cardiac telemetry  -Cardiology consulted  -Received 40 mg IV Lasix with apparent good urinary output, will reassess in a.m.  -Continue diltiazem drip  -Will hold off on albuterol in setting of tachycardia.  If ongoing wheezing we will plan to give ipratropium and consider steroids for possible asthma exacerbation  -Page out to cardiology: discussed okay to give additional 5 of metoprolol if needed for management overnight and BP sufficient  -Fluid restriction 2L  -AM BMP  -ECHO  -Heparin    Asthma  -Hold PTA  albuterol in setting of tachycardia     DMT2  Manages with metformin only.  Last A1c 6.9 on 6/2023.  Glucose mildly elevated on admission.  -A1c  -Continue PTA metformin  -Patient does not typically check blood sugars and is not on insulin, will hold off on blood sugar checks at this time and reassess    Thyroid goiter  Cirrhotic liver  Incidental findings on chest CT.  Found to have large thyroid goiter with intrathoracic extension recommending thyroid US.  Also has cirrhotic liver morphology and to consider nonemergent RUQ US.    HTN  -Hold PTA losartan in setting of normal BP and A-fib management as above       Diet: Fluid restriction 2000 ML FLUID  Moderate Consistent Carb (60 g CHO per Meal) Diet  DVT Prophylaxis: Heparin  Boswell Catheter: Not present  Fluids: PO  Lines: None     Cardiac Monitoring: ACTIVE order. Indication: Tachyarrhythmias, acute (48 hours)  Code Status: Full Code    Disposition Plan      Expected Discharge Date: 01/31/2024                The patient's care was discussed with the Attending Physician, Dr. Bertha Martinez. Patient to be seen in AM by Dr. Rosalind Meneses .      Clementina Espinoza MD  Hospitalist Service  [facility]  Securely message with [employer] (more info)  Text page via [employer] Paging/Directory   ______________________________________________________________________    Chief Complaint   SOB    History is obtained from the patient    History of Present Illness   Maritza Thomas is a 83 year old female who has a history of HTN, T2DM, asthma and is presenting with SOB and found to have afib with RVR.     Patient reports presenting for routine medical visit for diabetes today and reported shortness of breath at that time.  Found to be satting 82% on RA.  EKG done and found to be in A-fib with RVR.  Recommended presenting to the ED.    Patient reports shortness of breath is improved since arriving to the ED.  Shortness of breath is worse when lying down.  Has a mild  cough, reports this has been present for the last 3 weeks with some worsening, was initially dry and now mildly productive.  Initially was using her inhaler which helped at first though was not helping today.  Also noticing swelling in bilateral legs which is new for her.  Reports no fevers, no chest pain, no palpitations, no nausea/vomiting, no diarrhea.  No pain with urination.  No significant changes to medications.  Patient does report being told that she has an irregular rhythm and she said that she knew about this for many years though has never seen a cardiologist or managed it in other ways.    Reports she lives at home alone, reports no alcohol, no smoking, no other drug use.  Manages medications by herself, is independent.  Does use a cane.        ED course:  Presented afebrile, , /82, RR 20, satting 87% on RA, satting mid 90s percent on 4 L via NC.  Received initial bolus of diltiazem and placed on Dilt drip with improvement of HR down to 150, BP 140s systolic.    BMP wnl, proBNP 1076.  TSH normal.  Glucose 208.  High-sensitivity troponin 24 and 22 on repeat.  CBC wnl.  INR 1.22.  Negative for COVID, influenza A/B, RSV.  EKG showing A-fib with RVR.  CT PE without PE though is showing moderate right and small left pleural effusions.  Also found to have large thyroid goiter with intrathoracic extension recommending thyroid US.  Also has cirrhotic liver morphology and to consider nonemergent RUQ US.    In the ED received bolus of diltiazem, diltiazem drip was started and maxed out at 15, patient received 40 mg IV Lasix.    Past Medical History    No past medical history on file.    Past Surgical History   Past Surgical History:   Procedure Laterality Date    APPENDECTOMY         Prior to Admission Medications   Prior to Admission Medications   Prescriptions Last Dose Informant Patient Reported? Taking?   PROAIR HFA 90 mcg/actuation inhaler 1/29/2024 at 1300, has with  No Yes   Sig: [PROAIR HFA 90  MCG/ACTUATION INHALER] INHALE TWO PUFFS BY MOUTH EVERY 4 TO 6 HOURS AS NEEDED - MAX OF 12 PUFFS IN 24 HOURS   Vitamin D3 (CHOLECALCIFEROL) 25 mcg (1000 units) tablet 1/29/2024 at am  Yes Yes   Sig: Take 25 mcg by mouth daily   atorvastatin (LIPITOR) 40 MG tablet 1/28/2024 at hs  No Yes   Sig: [ATORVASTATIN (LIPITOR) 40 MG TABLET] TAKE ONE TABLET BY MOUTH DAILY AT BEDTIME   cyanocobalamin (VITAMIN B-12) 100 MCG tablet 1/29/2024 at am  Yes Yes   Sig: Take 100 mcg by mouth daily   fluticasone (FLONASE) 50 MCG/ACT nasal spray 1/29/2024 at am x1, not with  Yes Yes   Sig: Spray 1 spray into both nostrils 2 times daily   losartan (COZAAR) 50 MG tablet 1/28/2024 at pm  Yes Yes   Sig: Take 50 mg by mouth every evening   metFORMIN (GLUCOPHAGE XR) 500 MG 24 hr tablet 1/29/2024 at am x1  Yes Yes   Sig: Take 500 mg by mouth 2 times daily (with meals)   multivitamin (CENTRUM SILVER) tablet 1/29/2024 at am  Yes Yes   Sig: Take 1 tablet by mouth daily      Facility-Administered Medications: None           Physical Exam   Vital Signs: Temp: 98  F (36.7  C) Temp src: Oral BP: 125/78 Pulse: 115   Resp: (!) 31 SpO2: 90 % O2 Device: Nasal cannula Oxygen Delivery: 5 LPM  Weight: 220 lbs 0 oz    Physical Exam  Constitutional:       General: She is not in acute distress.     Comments: Nasal cannula in place.  Speaking comfortably in full sentences.   Eyes:      Extraocular Movements: Extraocular movements intact.      Conjunctiva/sclera: Conjunctivae normal.   Cardiovascular:      Rate and Rhythm: Tachycardia present. Rhythm irregular.      Heart sounds: No murmur heard.     No gallop.   Pulmonary:      Effort: Pulmonary effort is normal.      Comments: Expiratory wheezes noted bilaterally.  Abdominal:      General: Bowel sounds are normal.      Tenderness: There is no abdominal tenderness.      Comments: Mild distention   Musculoskeletal:      Comments: Trace pitting edema bilaterally to mid calf   Skin:     General: Skin is warm and dry.    Neurological:      General: No focal deficit present.      Mental Status: She is alert and oriented to person, place, and time.   Psychiatric:         Mood and Affect: Mood normal.         Behavior: Behavior normal.         Data     I have personally reviewed the following data over the past 24 hrs:    7.8  \   11.7   / 177     141 103 13.0 /  208 (H)   4.2 24 0.73 \     Trop: 22 (H) BNP: 1,076     TSH: 1.00 T4: N/A A1C: N/A     INR:  1.22 (H) PTT:  N/A   D-dimer:  N/A Fibrinogen:  N/A       Imaging results reviewed over the past 24 hrs:   Recent Results (from the past 24 hour(s))   CT Chest Pulmonary Embolism w Contrast    Narrative    EXAM: CT CHEST PULMONARY EMBOLISM W CONTRAST  LOCATION: [facility]  DATE: 1/29/2024    INDICATION: sob hypoxic sub inr  COMPARISON: None.  TECHNIQUE: CT chest pulmonary angiogram during arterial phase injection of IV contrast. Multiplanar reformats and MIP reconstructions were performed. Dose reduction techniques were used.   CONTRAST: 90 mL Isovue 370    FINDINGS:  ANGIOGRAM CHEST: No pulmonary embolism. Enlargement of the main pulmonary artery which can be seen with pulmonary hypertension. Thoracic aorta is negative for dissection. No CT evidence of right heart strain.    LUNGS AND PLEURA: Diffuse smooth intralobular septal thickening with centrilobular groundglass attenuation. Moderate right and small left pleural effusions with associated compressive atelectasis. Calcified pulmonary granulomas.    MEDIASTINUM/AXILLAE: Large thyroid goiter with leftward deviation of the trachea and intrathoracic extension. Aortic valve and mitral annular calcification. Hypoattenuation of the left atrial appendage.    CORONARY ARTERY CALCIFICATION: Severe.    UPPER ABDOMEN: Nodular hepatic contour suggesting underlying cirrhosis. Benign calcified splenic granulomas.    MUSCULOSKELETAL: Multilevel hypertrophic and degenerative changes in the thoracic spine.       Impression    IMPRESSION:  1.  No pulmonary embolism.  2.  Enlargement main pulmonary which can be seen with pulmonary hypertension.  3.  Hypoattenuation of the left atrial appendage. While this could represent contrast mixing artifact, a echocardiogram is recommended to exclude thrombus.  4.  Findings suggesting cardiogenic pulmonary edema with moderate right and small left pleural effusions.  5.  Large thyroid goiter with intrathoracic extension. Recommend thyroid ultrasound for further characterization.  6.  Cirrhotic liver morphology. Consider nonemergent right upper quadrant abdominal ultrasound for further characterization.

## 2024-01-29 NOTE — ED NOTES
Expected Patient Referral to ED  1:34 PM    Referring Clinic/Provider:  [facility]    Reason for referral/Clinical facts:  Presented for routine office visit, found to be in atrial fibrillation with RVR rate 180s, hypoxia    Recommendations provided:  Send to ED for further evaluation    Caller was informed that this institution does possess the capabilities and/or resources to provide for patient and should be transferred to our facility.    Discussed that if direct admit is sought and any hurdles are encountered, this ED would be happy to see the patient and evaluate.    Informed caller that recommendations provided are recommendations based only on the facts provided and that they responsible to accept or reject the advice, or to seek a formal in person consultation as needed and that this ED will see/treat patient should they arrive.      Jignesh Patel MD  [facility] EMERGENCY ROOM  [address] [facility] [address]  [phone]     Jignesh Patel MD  01/29/24 [id number]

## 2024-01-30 ENCOUNTER — APPOINTMENT (OUTPATIENT)
Dept: ULTRASOUND IMAGING | Facility: CLINIC | Age: 84
DRG: 308 | End: 2024-01-30
Payer: COMMERCIAL

## 2024-01-30 ENCOUNTER — APPOINTMENT (OUTPATIENT)
Dept: CARDIOLOGY | Facility: CLINIC | Age: 84
DRG: 308 | End: 2024-01-30
Payer: COMMERCIAL

## 2024-01-30 ENCOUNTER — APPOINTMENT (OUTPATIENT)
Dept: PHYSICAL THERAPY | Facility: CLINIC | Age: 84
DRG: 308 | End: 2024-01-30
Payer: COMMERCIAL

## 2024-01-30 LAB
ANION GAP SERPL CALCULATED.3IONS-SCNC: 11 MMOL/L (ref 7–15)
BUN SERPL-MCNC: 12.9 MG/DL (ref 8–23)
CALCIUM SERPL-MCNC: 9.4 MG/DL (ref 8.8–10.2)
CHLORIDE SERPL-SCNC: 101 MMOL/L (ref 98–107)
CREAT SERPL-MCNC: 0.89 MG/DL (ref 0.51–0.95)
DEPRECATED HCO3 PLAS-SCNC: 31 MMOL/L (ref 22–29)
EGFRCR SERPLBLD CKD-EPI 2021: 63 ML/MIN/1.73M2
GLUCOSE SERPL-MCNC: 178 MG/DL (ref 70–99)
HOLD SPECIMEN: NORMAL
LVEF ECHO: NORMAL
POTASSIUM SERPL-SCNC: 4.4 MMOL/L (ref 3.4–5.3)
SODIUM SERPL-SCNC: 143 MMOL/L (ref 135–145)
T4 FREE SERPL-MCNC: 1.32 NG/DL (ref 0.9–1.7)

## 2024-01-30 PROCEDURE — 250N000011 HC RX IP 250 OP 636: Performed by: INTERNAL MEDICINE

## 2024-01-30 PROCEDURE — 250N000011 HC RX IP 250 OP 636: Performed by: EMERGENCY MEDICINE

## 2024-01-30 PROCEDURE — 93306 TTE W/DOPPLER COMPLETE: CPT | Mod: 26 | Performed by: INTERNAL MEDICINE

## 2024-01-30 PROCEDURE — 250N000013 HC RX MED GY IP 250 OP 250 PS 637

## 2024-01-30 PROCEDURE — 250N000013 HC RX MED GY IP 250 OP 250 PS 637: Performed by: INTERNAL MEDICINE

## 2024-01-30 PROCEDURE — 84439 ASSAY OF FREE THYROXINE: CPT

## 2024-01-30 PROCEDURE — 97161 PT EVAL LOW COMPLEX 20 MIN: CPT | Mod: GP

## 2024-01-30 PROCEDURE — 36415 COLL VENOUS BLD VENIPUNCTURE: CPT

## 2024-01-30 PROCEDURE — 80048 BASIC METABOLIC PNL TOTAL CA: CPT

## 2024-01-30 PROCEDURE — 99223 1ST HOSP IP/OBS HIGH 75: CPT | Mod: AI

## 2024-01-30 PROCEDURE — 84481 FREE ASSAY (FT-3): CPT

## 2024-01-30 PROCEDURE — 97116 GAIT TRAINING THERAPY: CPT | Mod: GP

## 2024-01-30 PROCEDURE — 76536 US EXAM OF HEAD AND NECK: CPT

## 2024-01-30 PROCEDURE — 250N000011 HC RX IP 250 OP 636

## 2024-01-30 PROCEDURE — 210N000002 HC R&B HEART CARE

## 2024-01-30 PROCEDURE — 99222 1ST HOSP IP/OBS MODERATE 55: CPT | Performed by: INTERNAL MEDICINE

## 2024-01-30 PROCEDURE — 255N000002 HC RX 255 OP 636: Performed by: STUDENT IN AN ORGANIZED HEALTH CARE EDUCATION/TRAINING PROGRAM

## 2024-01-30 PROCEDURE — 86376 MICROSOMAL ANTIBODY EACH: CPT

## 2024-01-30 PROCEDURE — C8929 TTE W OR WO FOL WCON,DOPPLER: HCPCS

## 2024-01-30 RX ORDER — METOPROLOL TARTRATE 50 MG
50 TABLET ORAL 2 TIMES DAILY
Status: DISCONTINUED | OUTPATIENT
Start: 2024-01-30 | End: 2024-01-30

## 2024-01-30 RX ORDER — FUROSEMIDE 10 MG/ML
40 INJECTION INTRAMUSCULAR; INTRAVENOUS ONCE
Status: COMPLETED | OUTPATIENT
Start: 2024-01-30 | End: 2024-01-30

## 2024-01-30 RX ORDER — METOPROLOL TARTRATE 50 MG
50 TABLET ORAL 2 TIMES DAILY
Status: DISCONTINUED | OUTPATIENT
Start: 2024-01-30 | End: 2024-01-31

## 2024-01-30 RX ADMIN — APIXABAN 5 MG: 5 TABLET, FILM COATED ORAL at 19:51

## 2024-01-30 RX ADMIN — METOPROLOL TARTRATE 50 MG: 25 TABLET, FILM COATED ORAL at 09:43

## 2024-01-30 RX ADMIN — Medication 100 MCG: at 08:07

## 2024-01-30 RX ADMIN — FUROSEMIDE 40 MG: 10 INJECTION, SOLUTION INTRAMUSCULAR; INTRAVENOUS at 06:25

## 2024-01-30 RX ADMIN — METOPROLOL TARTRATE 50 MG: 50 TABLET, FILM COATED ORAL at 19:51

## 2024-01-30 RX ADMIN — PERFLUTREN 2 ML: 6.52 INJECTION, SUSPENSION INTRAVENOUS at 08:47

## 2024-01-30 RX ADMIN — ATORVASTATIN CALCIUM 40 MG: 40 TABLET, FILM COATED ORAL at 19:51

## 2024-01-30 RX ADMIN — Medication 15 MG/HR: at 01:17

## 2024-01-30 RX ADMIN — METFORMIN ER 500 MG 500 MG: 500 TABLET ORAL at 08:06

## 2024-01-30 RX ADMIN — Medication 15 MG/HR: at 09:34

## 2024-01-30 RX ADMIN — METFORMIN ER 500 MG 500 MG: 500 TABLET ORAL at 18:47

## 2024-01-30 RX ADMIN — FUROSEMIDE 40 MG: 10 INJECTION, SOLUTION INTRAMUSCULAR; INTRAVENOUS at 09:44

## 2024-01-30 RX ADMIN — APIXABAN 5 MG: 5 TABLET, FILM COATED ORAL at 09:43

## 2024-01-30 ASSESSMENT — ACTIVITIES OF DAILY LIVING (ADL)
ADLS_ACUITY_SCORE: 37
ADLS_ACUITY_SCORE: 22
ADLS_ACUITY_SCORE: 37
ADLS_ACUITY_SCORE: 22
ADLS_ACUITY_SCORE: 37
ADLS_ACUITY_SCORE: 22
ADLS_ACUITY_SCORE: 37
ADLS_ACUITY_SCORE: 37

## 2024-01-30 NOTE — PROGRESS NOTES
[facility]    Progress Note - Hospitalist Service       Date of Admission:  1/29/2024    Assessment & Plan   Maritza Thomas is a 83 year old female admitted on 1/29/2024. She has a history of HTN, T2DM, asthma and is admitted on 1/29/2024 for afib with RVR.     Acute hypoxic respiratory failure  A-fib with RVR  Bilateral pleural effusions  Pulmonary hypertension  Patient presented to ED for new A-fib with RVR. CT PE showed moderate right and small left pleural effusions.  Echo showed EF 60-65% with left atrium severely dilated, moderate to severe valvular aortic stenosis and pulmonary hypertension.  Pulmonary hypertension could be linked to undiagnosed TONY.  Patient is requiring 2 LPM of oxygen, she is not on any oxygen at home.  -Cardiac telemetry  -Cardiology consulted, appreciate recommendations  -Metoprolol 50 mg BID, transition off diltiazem  -Apixaban 5 mg BID  -If able to optimize ventricular response, will plan on eventual direct-current cardioversion after 3-4 weeks of full anticoagulation.  -Additioal dose of IV furosemide 40 mg    -Fluid restriction 2L  -AM BMP  -Recommend outpatient sleep study  -Moderate consistent carb diet  -PT  -Pharmacy test claim for Eliquis and Xarelto     Asthma  -Hold PTA albuterol in setting of tachycardia, can reevaluate if increased SOB      DMT2  Manages with metformin only.  Last A1c 6.9 on 6/2023.  Glucose mildly elevated on admission.  A1c was 7.3 on admission.  -Continue PTA metformin     Thyroid goiter  Cirrhotic liver  Incidental findings on chest CT.  Found to have large thyroid goiter with intrathoracic extension recommending thyroid US.  Also has cirrhotic liver morphology and to consider nonemergent RUQ US.  -Thyroid ultrasound  -Free T3, T4, thyroid peroxidase antibody     HTN  -Hold PTA losartan in setting of normal BP and A-fib management as above         Diet: Fluid restriction 2000 ML FLUID  Moderate Consistent Carb (60 g CHO per  "Meal) Diet    DVT Prophylaxis: DOAC  Boswell Catheter: Not present  Fluids: P.o.  Lines: None     Cardiac Monitoring: ACTIVE order. Indication: Tachyarrhythmias, acute (48 hours)  Code Status: Full Code      Clinically Significant Risk Factors Present on Admission               # Coagulation Defect: INR = 1.22 (Ref range: 0.85 - 1.15) and/or PTT = N/A, will monitor for bleeding    # Hypertension: Noted on problem list     # DMII: A1C = 7.3 % (Ref range: <5.7 %) within past 6 months    # Obesity: Estimated body mass index is 36.61 kg/m  as calculated from the following:    Height as of this encounter: 1.651 m (5' 5\").    Weight as of this encounter: 99.8 kg (220 lb).       # Asthma: noted on problem list        Disposition Plan     Expected Discharge Date: 01/31/2024                The patient's care was discussed with the Attending Physician, Dr. Meneses .    Cleo Joshi MD PGY1  Hospitalist Service  [facility]  Securely message with [employer] info)  Text page via [facility] Paging/Directory   ______________________________________________________________________    Interval History   Patient states he feels better than on admission.  States she has not been coughing, and feels less short of breath.  She denies vomiting, nausea, abdominal pain, lightheadedness.  She states she has heard she has had atrial fibrillation in the past and thought she had been worked up for this.  She denies feelings of heart palpitation.    Physical Exam   Vital Signs: Temp: 98  F (36.7  C) Temp src: Oral BP: 128/74 Pulse: 110   Resp: 20 SpO2: 92 % O2 Device: Nasal cannula Oxygen Delivery: 3 LPM  Weight: 220 lbs 0 oz    GENERAL: alert and no acute distress  EYES: eyes grossly normal to inspection, PERRLA, EOM intact  HENT: hearing grossly intact, moist mucus membranes  RESP: Bibasilar fine crackles, no wheezing, no crackles, nasal cannula  CV: Irregular rate and rhythm, systolic murmur appreciated  ABDOMEN: " soft, nontender, and bowel sounds normal  MS: no gross musculoskeletal defects noted, lower extremity edema  PSYCH: mentation appears normal, affect normal/bright    Data     I have personally reviewed the following data over the past 24 hrs:    N/A  \   N/A   / N/A     143 101 12.9 /  178 (H)   4.4 31 (H) 0.89 \     Trop: 22 (H) BNP: N/A     TSH: N/A T4: 1.32 A1C: N/A       Imaging results reviewed over the past 24 hrs:   Recent Results (from the past 24 hour(s))   Echocardiogram Complete   Result Value    LVEF  60-65%    Narrative    [id number]  [id number]  [id number]  [id number]^MARY^[place] [address]     Name: MOUNA DAVEY  MRN: [id number]  : 1940  Study Date: 2024 08:21 AM  Age: 83 yrs  Gender: Female  Patient Location: [facility]  Reason For Study: Atrial Fibrillation  Ordering Physician: LIZZETTE HERNANDEZ  Performed By: ACE     BSA: 2.1 m2  Height: 65 in  Weight: 220 lb  HR: 96  BP: 128/74 mmHg  ______________________________________________________________________________  Procedure  Complete Portable Echo Adult. Definity (NDC #93242-914) given intravenously.  Technically difficult study. There is no comparison study available.  ______________________________________________________________________________  Interpretation Summary     The left ventricle is normal in size with normal left ventricular wall  thickness. Left ventricular function is normal.The ejection fraction is 60-  65%.  Normal right ventricle size and systolic function.  The left atrium is severely dilated.  There is mild mitral stenosis. The mitral valve area is 1.3cm2.  Moderate to severe valvular aortic stenosis with peak velocity of 3.5 m/s,  mean gradient of 39 mmHg, and SINCERE of 0.9 cm2 at SVI of 31  Mild (35-45mmHg) pulmonary hypertension is present.  There is no comparison study available.  ______________________________________________________________________________  Left  Ventricle  The left ventricle is normal in size. Left ventricular function is normal.The  ejection fraction is 60-65%. There is normal left ventricular wall thickness.  Diastolic function not assessed due to atrial fibrillation. No regional wall  motion abnormalities noted.     Right Ventricle  Normal right ventricle size and systolic function.     Atria  The left atrium is severely dilated. The right atrium is moderately dilated.  There is no color Doppler evidence of an atrial shunt.     Mitral Valve  There is moderate mitral annular calcification. There is mild mitral stenosis.  The mean mitral valve gradient is 5.8 mmHg. The mitral valve area is 1.3cm2.     Tricuspid Valve  Tricuspid valve leaflets appear normal. There is no evidence of tricuspid  stenosis or clinically significant tricuspid regurgitation. The right  ventricular systolic pressure is approximated at 36.7 mmHg plus the right  atrial pressure. Mild (35-45mmHg) pulmonary hypertension is present.     Aortic Valve  Moderate aortic valve calcification is present. No aortic regurgitation is  present. Moderate to severe valvular aortic stenosis.     Pulmonic Valve  The pulmonic valve is not well seen, but is grossly normal. This degree of  valvular regurgitation is within normal limits. There is trace pulmonic  valvular regurgitation.     Vessels  The aorta root is normal. Normal size ascending aorta. IVC diameter <2.1 cm  collapsing >50% with sniff suggests a normal RA pressure of 3 mmHg.     Pericardium  There is no pericardial effusion.     Rhythm  The rhythm was atrial fibrillation.  ______________________________________________________________________________  MMode/2D Measurements & Calculations     IVSd: 1.0 cm  LVIDd: 4.5 cm  LVIDs: 3.0 cm  LVPWd: 1.1 cm  FS: 34.4 %  LV mass(C)d: 170.1 grams  LV mass(C)dI: 82.6 grams/m2  Ao root diam: 3.5 cm  LA dimension: 5.4 cm  asc Aorta Diam: 3.6 cm  LA/Ao: 1.5  LVOT diam: 2.0 cm  LVOT area: 3.0 cm2  Ao root  diam index Ht(cm/m): 2.1  Ao root diam index BSA (cm/m2): 1.7  Asc Ao diam index BSA (cm/m2): 1.8  Asc Ao diam index Ht(cm/m): 2.2  LA Volume (BP): 93.7 ml     LA Volume Index (BP): 45.5 ml/m2  LA Volume Indexed (AL/bp): 49.4 ml/m2  RV Base: 4.0 cm  RWT: 0.49  TAPSE: 0.74 cm     Time Measurements  MM HR: 96.0 BPM     Doppler Measurements & Calculations  MV E max dru: 196.0 cm/sec  MV max P.0 mmHg  MV mean P.8 mmHg  MV V2 VTI: 50.6 cm  MVA(VTI): 1.3 cm2  MV dec slope: 792.0 cm/sec2  MV dec time: 0.25 sec  Ao V2 max: 349.7 cm/sec  Ao max P.0 mmHg  Ao V2 mean: 292.1 cm/sec  Ao mean P.2 mmHg  Ao V2 VTI: 81.2 cm  SINCERE(I,D): 0.79 cm2  SINCERE(V,D): 0.91 cm2  LV V1 max P.5 mmHg  LV V1 max: 106.5 cm/sec  LV V1 VTI: 21.4 cm  SV(LVOT): 64.3 ml  SI(LVOT): 31.2 ml/m2  TR max dru: 302.8 cm/sec  TR max P.7 mmHg  AV Dru Ratio (DI): 0.30  SINCERE Index (cm2/m2): 0.38  E/E': 41.3  E/E' av.8  Lateral E/e': 41.3  Medial E/e': 44.3     Peak E' Dru: 4.8 cm/sec  RV S Dru: 10.3 cm/sec     ______________________________________________________________________________  Report approved by: Chuck Yeh 2024 10:17 AM         US Thyroid    Narrative    EXAM: US THYROID  LOCATION: [facility]  DATE: 2024    INDICATION: New A fib, goiter  COMPARISON: CT chest 2024  TECHNIQUE: Thyroid ultrasound.     FINDINGS:  Evaluation is limited by the size of the thyroid gland, patient body habitus, neck size, and poor sonographic penetration of the entire thyroid.    RIGHT lobe: 8.6 x 4.9 x 4.4 cm. Heterogenous echotexture.  Isthmus: 5 mm.  LEFT lobe: 3.8 x 2.0 x 2.5 cm. Heterogenous echotexture.    NECK: No cervical lymphadenopathy.    NODULES:  The inferior aspect of the right thyroid gland extends into the retrosternal region where it is suboptimally visualized. This is difficult to distinguish from an exophytic nodule versus retrosternal thyroid parenchyma.      Impression     IMPRESSION:  Technically difficult examination with very limited sonographic visualization of the retrosternal portion of the thyroid gland. The entire gland is enlarged and heterogeneous, therefore difficult to distinguish an exophytic nodule from retrosternal   parenchyma.    Nodules are characterized per  ACR Thyroid Imaging, Reporting and Data System (TI-RADS): White Paper of the ACR TI-RADS Committee  Damaso Powers et al. Journal of the American College of Radiology 2017. Volume 14 (2017), Issue 5, [id number].

## 2024-01-30 NOTE — PROGRESS NOTES
01/30/24 1350   Appointment Info   Signing Clinician's Name / Credentials (PT) Herminio Krishna, PT   Quick Adds   Quick Adds Certification   Living Environment   People in Home alone   Current Living Arrangements house   Home Accessibility stairs to enter home;stairs within home   Number of Stairs, Main Entrance none   Number of Stairs, Within Home, Primary greater than 10 stairs   Stair Railings, Within Home, Primary railings safe and in good condition   Living Environment Comments bedroom on 2nd floor but can sleep on main floor   Self-Care   Usual Activity Tolerance moderate   Current Activity Tolerance moderate   Equipment Currently Used at Home none   Fall history within last six months no   Activity/Exercise/Self-Care Comment Indep with all I ADL's and ADL's, looking for a cleaning lady   General Information   Onset of Illness/Injury or Date of Surgery 01/29/24   Pertinent History of Current Problem (include personal factors and/or comorbidities that impact the POC) Acute hypoxemic respiratory failure (H) 1/29/2024    Acute pulmonary edema (H) 1/29/2024    Atrial fibrillation with RVR (H)   Cognition   Affect/Mental Status (Cognition) WNL   Follows Commands (Cognition) WFL   Range of Motion (ROM)   Range of Motion ROM is WFL   Strength (Manual Muscle Testing)   Strength (Manual Muscle Testing) No deficits observed during functional mobility   Bed Mobility   Bed Mobility sit-supine;supine-sit   Supine-Sit [place] (Bed Mobility) independent   Transfers   Transfers sit-stand transfer   Sit-Stand Transfer   Sit-Stand [place] (Transfers) contact guard;verbal cues   Assistive Device (Sit-Stand Transfers) cane, straight   Gait/Stairs (Locomotion)   [place] Level (Gait) contact guard   Assistive Device (Gait) cane, straight   Distance in Feet (Gait) 15   Balance   Balance no deficits were identified   Clinical Impression   Criteria for Skilled Therapeutic Intervention Yes, treatment indicated   PT  Diagnosis (PT) Impaired functional mobility   Influenced by the following impairments decreased activity tolerance   Functional limitations due to impairments transfers, gait   Clinical Presentation (PT Evaluation Complexity) stable   Clinical Presentation Rationale Patient presents as medically diagnosed   Clinical Decision Making (Complexity) low complexity   Planned Therapy Interventions (PT) gait training;transfer training;patient/family education   Risk & Benefits of therapy have been explained patient   PT Total Evaluation Time   PT Eval, Low Complexity Minutes (88532) 10   Physical Therapy Goals   PT Frequency One time eval and treatment only   PT Predicted Duration/Target Date for Goal Attainment 01/30/24   PT Goals Transfers;Gait   PT: Transfers Sit to/from stand;Independent;Goal Met   PT: Gait Modified independent;Straight cane;50 feet;Goal Met   Interventions   Interventions Quick Adds Gait Training   Gait Training   Gait Training Minutes (19811) 15   Symptoms Noted During/After Treatment (Gait Training) shortness of breath   Treatment Detail/Skilled Intervention Patient ambulating with stable steady gait with use of cane.  Paitent at baseline does not use cane but has been using past few days.  Patient on 2 liters but when ambulating on RA .   Sats drop to 85% and do not seem to come up until put back on 2 liters and then return 60-90 seconds to 92%.  Patient states breathing has improved greatly since admit and hopes to return to home tomorrow.  Paitent gait stable vc for safety due to iv line and oxygen lines   Distance in Feet 15, 30, 30, 70   [place] Level (Gait Training) independent   Assistive Device (Gait Training) straight cane   Pattern Analysis (Gait Training) swing-through gait   Gait Analysis Deviations decreased step length   Impairments (Gait Analysis/Training) other (see comments)  (decreased activity tolerance)   PT Discharge Planning   PT Plan dc PT   PT Discharge Recommendation (DC  Rec) home with assist   PT Rationale for DC Rec Paitent moving well overall , breathing is only concern for patient.   Patient denies any concerns about mobility, has good home setup and support system   PT Brief overview of current status Patient Indep with gait/transfers, will need SBA while in hospital due to IV pole and oxygen tubing and encouraged to continue ambulting with nursing   PT Equipment Needed at Discharge cane, straight   Total Session Time   Timed Code Treatment Minutes 15   Total Session Time (sum of timed and untimed services) 25

## 2024-01-30 NOTE — PROGRESS NOTES
Discharge Pharmacy Test Claim    Eliquis and xarelto are covered with copays of $35 per month through patient's [facility] Medicare advantage plan.    Test Claim Copay   eliquis 35.00   xarelto 35.00     Karon Mcmahon  Covering Miley Messina  1/30  [facility] Pharmacy Liaison  Ph: [phone]  Fax [phone]  Available on [employer] (learn more here)

## 2024-01-30 NOTE — PLAN OF CARE
"Problem: Gas Exchange Impaired  Goal: Optimal Gas Exchange  Outcome: Progressing  Intervention: Optimize Oxygenation and Ventilation  Recent Flowsheet Documentation  Taken 1/30/2024 0408 by Karina Barrientos RN  Head of Bed (HOB) Positioning: HOB at 45 degrees     Goal Outcome Evaluation:  /59 (BP Location: Right arm, Patient Position: Semi-Augustin's, Cuff Size: Adult Large)   Pulse 101   Temp 97.6  F (36.4  C) (Oral)   Resp 26   Ht 1.651 m (5' 5\")   Wt 99.8 kg (220 lb)   SpO2 94%   BMI 36.61 kg/m      Pt is a/ox4, calm and cooperative. Pt denied pain, N/V, and dizziness.  Resp: 3L via oxymask, was on 5L via NC, stating 94%. LL are crackles.  Cardiac: Afib with occasional PVCs  Dilt: 15mg/hr, vitals are stable  Total output: 1450 ([id number])  At 0630hrs, security informed RN that pt has her vehicle parked in the ED entrance, needs to be removed. Per security, pt will need to sign a form for security to removed the vehicle to the parking lot.                      "

## 2024-01-30 NOTE — CONSULTS
[facility] HEART CARE [address], [place] [address]   [email or web address]   OFFICE: [phone]        Impression and Plan     1.  Atrial fibrillation.  Maritza presents with new diagnosis of atrial fibrillation of unknown duration.  Maritza has no awareness of the rhythm disturbance.  Would advocate initiation of anticoagulation.  Plan:  Will initiate metoprolol 50 mg twice daily.  Will transition off IV diltiazem as able.  Initiate apixaban 5 mg twice daily.  If able to optimize ventricular response, will plan on eventual direct-current cardioversion after 3-4 weeks of full anticoagulation.  Echocardiogram is in process of being performed and will follow-up results accordingly.    2.  Hypervolemia.  Maritza still appears to have some fluid retention though she reports some subjective improvement already.    Will give additional IV furosemide 40 mg this morning and continue to follow.  Echocardiogram as per problem #1.    3.  Coronary artery disease.  Maritza has known coronary artery disease by virtue of CT scan this admission revealing severe coronary calcification.  ECG on presentation suggests possible prior anteroseptal infarct.  Relatively low high-sensitivity troponin values without ? change would suggest against any recent injury (24 ? 22 ng/L).  She has not had any chest pain symptoms.    4.  Hypertension.  Mild hypertension noted on presentation.  Blood pressure most recently 128/74 mmHg.  Metoprolol as per problem #1.    5.  Dyslipidemia.  Direct LDL 1 December 2022 was 55 mg/dL.  Continue statin therapy.    Primary Cardiologist: Dr. Cuate Rico     History of Present Illness    Maritza Thomas is a 83 year old female who was seen routinely for medical visit related to diabetes.  She had reported some shortness of breath.  Maritza was noted to have atrial fibrillation with increased ventricular response on presentation.  Shortness of breath  "symptoms were made worse when laying flat.  She will she also reported some new lower extremity edema.    On interview, Maritza has no awareness of the rhythm disturbance.  Feels her breathing is already somewhat improved.  She denies any chest pain now or in the recent past.      Further review of systems is otherwise negative/noncontributory (medical record and 13 point review of systems reviewed as well and pertinent positives noted).    Cardiac Diagnostics   Telemetry (personally reviewed): Atrial fibrillation    Twelve-lead ECG (personally reviewed) 29 January 2024: Atrial fibrillation with heart rate of 175 bpm.  Somewhat low voltage.  Cannot rule out anteroseptal infarct.    Medical History  Surgical History   No past medical history on file.   Past Surgical History:   Procedure Laterality Date    APPENDECTOMY            Family History/Social History/Risk Factors   Patient does not smoke.  Family history reviewed.      Physical Examination   /74 (BP Location: Right arm)   Pulse 110   Temp 98  F (36.7  C) (Oral)   Resp 20   Ht 1.651 m (5' 5\")   Wt 99.8 kg (220 lb)   SpO2 92%   BMI 36.61 kg/m    Wt Readings from Last 5 Encounters:   01/29/24 99.8 kg (220 lb)   02/03/17 108.9 kg (240 lb)   08/09/16 105.2 kg (232 lb)   03/29/16 105.2 kg (232 lb)   01/05/16 107.7 kg (237 lb 8 oz)     Wt Readings from Last 3 Encounters:   01/29/24 99.8 kg (220 lb)   02/03/17 108.9 kg (240 lb)   08/09/16 105.2 kg (232 lb)       Intake/Output Summary (Last 24 hours) at 1/30/2024 0813  Last data filed at 1/29/2024 2354  Gross per 24 hour   Intake --   Output 1450 ml   Net -1450 ml     The patient is alert and oriented times three. Sclerae are anicteric. Mucosal membranes are moist.  No significant adenopathy/thyromegally appreciated. Lungs are managed bilaterally with perhaps a few fine crackles at the bases though no overt \"wet\" sounding crackles.  On cardiovascular exam, the patient has an irregular S1 and S2.  There is " "a 2-3/6 systolic murmur heard in a sash-like distribution.  Abdomen is soft and non-tender. Extremities reveal no clubbing, cyanosis, with some continued lower extremity edema.         Imaging      CT scan of chest 29 January 2024:  No pulmonary embolism.  Enlargement main pulmonary which can be seen with pulmonary hypertension.  Hypoattenuation of the left atrial appendage. While this could represent contrast mixing artifact, a echocardiogram is recommended to exclude thrombus.  Findings suggesting cardiogenic pulmonary edema with moderate right and small left pleural effusions.  Large thyroid goiter with intrathoracic extension. Recommend thyroid ultrasound for further characterization.  Cirrhotic liver morphology. Consider nonemergent right upper quadrant abdominal ultrasound for further characterization.  Coronary calcification: Severe.    Lab Results     Recent Labs   Lab 01/30/24  0556 01/29/24  1739 01/29/24  1505 01/29/24  1504   WBC  --   --  7.8  --    HGB  --   --  11.7  --    MCV  --   --  96  --    PLT  --   --  177  --    INR  --   --   --  1.22*     --  141  --    POTASSIUM 4.4  --  4.2  --    CHLORIDE 101  --  103  --    CO2 31*  --  24  --    BUN 12.9  --  13.0  --    CR 0.89 0.73 0.76  --    ANIONGAP 11  --  14  --    SHI 9.4  --  9.8  --    *  --  208*  --      No results for input(s): \"NTBNPI\", \"BNP\" in the last 61420 hours.    Invalid input(s): \"NTPNP\"  No lab results found in last 7 days.    Invalid input(s): \"LDLCALC\"  Lab Results   Component Value Date     01/30/2024    CO2 31 01/30/2024    BUN 12.9 01/30/2024     Lab Results   Component Value Date    WBC 7.8 01/29/2024    HGB 11.7 01/29/2024    HCT 37.1 01/29/2024    MCV 96 01/29/2024     01/29/2024     Lab Results   Component Value Date    CHOL 152 02/03/2017    TRIG 296 02/03/2017    HDL 36 02/03/2017     Recent Labs   Lab Test 02/03/17  1329   CHOL 152   HDL 36*   LDL 57   TRIG 296*     Lab Results   Component " "Value Date    INR 1.22 01/29/2024     No results found for: \"CKTOTAL\", \"CKMB\", \"TROPONINI\"  Lab Results   Component Value Date    TSH 1.00 01/29/2024         Current Inpatient Scheduled Medications   Scheduled Meds:   atorvastatin  40 mg Oral QPM    cyanocobalamin  100 mcg Oral Daily    heparin ANTICOAGULANT  5,000 Units Subcutaneous Q8H    metFORMIN  500 mg Oral BID w/meals    sodium chloride (PF)  3 mL Intracatheter Q8H     Continuous Infusions:   dilTIAZem 15 mg/hr (01/30/24 0624)          Medications Prior to Admission   Prior to Admission medications    Medication Sig Start Date End Date Taking? Authorizing Provider   atorvastatin (LIPITOR) 40 MG tablet [ATORVASTATIN (LIPITOR) 40 MG TABLET] TAKE ONE TABLET BY MOUTH DAILY AT BEDTIME 8/3/17  Yes Cristofer Ramirez MD   cyanocobalamin (VITAMIN B-12) 100 MCG tablet Take 100 mcg by mouth daily   Yes Unknown, Entered By History   fluticasone (FLONASE) 50 MCG/ACT nasal spray Spray 1 spray into both nostrils 2 times daily   Yes Unknown, Entered By History   losartan (COZAAR) 50 MG tablet Take 50 mg by mouth every evening   Yes Unknown, Entered By History   metFORMIN (GLUCOPHAGE XR) 500 MG 24 hr tablet Take 500 mg by mouth 2 times daily (with meals)   Yes Unknown, Entered By History   multivitamin (CENTRUM SILVER) tablet Take 1 tablet by mouth daily   Yes Unknown, Entered By History   PROAIR HFA 90 mcg/actuation inhaler [PROAIR HFA 90 MCG/ACTUATION INHALER] INHALE TWO PUFFS BY MOUTH EVERY 4 TO 6 HOURS AS NEEDED - MAX OF 12 PUFFS IN 24 HOURS 12/23/14  Yes Cristofer Ramirez MD   Vitamin D3 (CHOLECALCIFEROL) 25 mcg (1000 units) tablet Take 25 mcg by mouth daily   Yes Unknown, Entered By History          Clinically Significant Risk Factors Present on Admission   Clinically Significant Risk Factors Present on Admission                 # Coagulation Defect: INR = 1.22 (Ref range: 0.85 - 1.15) and/or PTT = N/A, will monitor for bleeding        # Hypertension: Noted on problem " "list       # DMII: A1C = 7.3 % (Ref range: <5.7 %) within past 6 months    # Obesity: Estimated body mass index is 36.61 kg/m  as calculated from the following:    Height as of this encounter: 1.651 m (5' 5\").    Weight as of this encounter: 99.8 kg (220 lb).       # Asthma: noted on problem list       Cardiac Arrhythmia: Atrial fibrillation: Unspecified      Fluid overload, unspecified                       "

## 2024-01-31 LAB
ANION GAP SERPL CALCULATED.3IONS-SCNC: 9 MMOL/L (ref 7–15)
BUN SERPL-MCNC: 17 MG/DL (ref 8–23)
CALCIUM SERPL-MCNC: 9.1 MG/DL (ref 8.8–10.2)
CHLORIDE SERPL-SCNC: 102 MMOL/L (ref 98–107)
CHOLEST SERPL-MCNC: 100 MG/DL
CREAT SERPL-MCNC: 0.85 MG/DL (ref 0.51–0.95)
DEPRECATED HCO3 PLAS-SCNC: 30 MMOL/L (ref 22–29)
EGFRCR SERPLBLD CKD-EPI 2021: 67 ML/MIN/1.73M2
GLUCOSE SERPL-MCNC: 145 MG/DL (ref 70–99)
HDLC SERPL-MCNC: 39 MG/DL
LDLC SERPL CALC-MCNC: 32 MG/DL
NONHDLC SERPL-MCNC: 61 MG/DL
POTASSIUM SERPL-SCNC: 3.5 MMOL/L (ref 3.4–5.3)
SODIUM SERPL-SCNC: 141 MMOL/L (ref 135–145)
T3FREE SERPL-MCNC: 2.8 PG/ML (ref 2–4.4)
THYROPEROXIDASE AB SERPL-ACNC: 13 IU/ML
TRIGL SERPL-MCNC: 146 MG/DL

## 2024-01-31 PROCEDURE — 36415 COLL VENOUS BLD VENIPUNCTURE: CPT

## 2024-01-31 PROCEDURE — 82465 ASSAY BLD/SERUM CHOLESTEROL: CPT | Performed by: INTERNAL MEDICINE

## 2024-01-31 PROCEDURE — 99232 SBSQ HOSP IP/OBS MODERATE 35: CPT | Performed by: INTERNAL MEDICINE

## 2024-01-31 PROCEDURE — 99232 SBSQ HOSP IP/OBS MODERATE 35: CPT | Mod: GC

## 2024-01-31 PROCEDURE — 250N000013 HC RX MED GY IP 250 OP 250 PS 637: Performed by: INTERNAL MEDICINE

## 2024-01-31 PROCEDURE — 210N000002 HC R&B HEART CARE

## 2024-01-31 PROCEDURE — 250N000013 HC RX MED GY IP 250 OP 250 PS 637

## 2024-01-31 PROCEDURE — 250N000009 HC RX 250: Performed by: INTERNAL MEDICINE

## 2024-01-31 PROCEDURE — 80048 BASIC METABOLIC PNL TOTAL CA: CPT

## 2024-01-31 RX ORDER — FUROSEMIDE 40 MG
40 TABLET ORAL
Status: DISCONTINUED | OUTPATIENT
Start: 2024-01-31 | End: 2024-02-01

## 2024-01-31 RX ORDER — METOPROLOL TARTRATE 1 MG/ML
5 INJECTION, SOLUTION INTRAVENOUS ONCE
Status: COMPLETED | OUTPATIENT
Start: 2024-01-31 | End: 2024-01-31

## 2024-01-31 RX ADMIN — FUROSEMIDE 40 MG: 40 TABLET ORAL at 18:00

## 2024-01-31 RX ADMIN — METOPROLOL TARTRATE 75 MG: 50 TABLET, FILM COATED ORAL at 18:00

## 2024-01-31 RX ADMIN — APIXABAN 5 MG: 5 TABLET, FILM COATED ORAL at 19:37

## 2024-01-31 RX ADMIN — METOPROLOL TARTRATE 75 MG: 50 TABLET, FILM COATED ORAL at 08:19

## 2024-01-31 RX ADMIN — METFORMIN ER 500 MG 500 MG: 500 TABLET ORAL at 18:01

## 2024-01-31 RX ADMIN — FUROSEMIDE 40 MG: 40 TABLET ORAL at 08:19

## 2024-01-31 RX ADMIN — APIXABAN 5 MG: 5 TABLET, FILM COATED ORAL at 08:19

## 2024-01-31 RX ADMIN — ATORVASTATIN CALCIUM 40 MG: 40 TABLET, FILM COATED ORAL at 19:37

## 2024-01-31 RX ADMIN — METOPROLOL TARTRATE 5 MG: 1 INJECTION, SOLUTION INTRAVENOUS at 14:51

## 2024-01-31 RX ADMIN — METFORMIN ER 500 MG 500 MG: 500 TABLET ORAL at 08:19

## 2024-01-31 RX ADMIN — Medication 100 MCG: at 08:19

## 2024-01-31 ASSESSMENT — ACTIVITIES OF DAILY LIVING (ADL)
ADLS_ACUITY_SCORE: 22
DEPENDENT_IADLS:: INDEPENDENT
ADLS_ACUITY_SCORE: 22

## 2024-01-31 NOTE — PLAN OF CARE
Problem: Gas Exchange Impaired  Goal: Optimal Gas Exchange  Outcome: Progressing     Problem: Dysrhythmia  Goal: Normalized Cardiac Rhythm  Outcome: Progressing   Goal Outcome Evaluation:    Patient is alert and oriented. Pt denies pain or SOB. Pt is SANTANA and breathing is labored at rest. Pt endorses being tired, napping throughout the day. Pt is on 2 L nasal cannula, O2 sats above 90%. Pt is A fib on tele. Dilt gtt remains off.     Jessy Mast RN

## 2024-01-31 NOTE — PROGRESS NOTES
Patient's daughter, Jayda called unit and spoke with MT. Daughter was yelling at staff for not notifying her off Pt's admission to hospital. Staff explained hospital policies and Jayda was not receptive. She demanded to be put on the Pt's contact list. Writer spoke with Pt and per her request she specified she does not want her daughter Jayda or her granddaughter Shoshana to be on her contact list. Her grandson Levi is her requested . Contact list was updated in EPIC.    Jessy Mast RN

## 2024-01-31 NOTE — PROGRESS NOTES
[facility] HEART CARE [address], [place] [address]   [email or web address]   OFFICE: [phone]            Impression and Plan     1.  Atrial fibrillation.  Maritza presents with new diagnosis of atrial fibrillation of unknown duration.  Maritza has no awareness of the rhythm disturbance.  Ventricular response improved though still a bit suboptimal.  Plan:  Continue metoprolol, but will increase to 75 mg twice daily.  Continue apixaban 5 mg twice daily.  If able to optimize ventricular response, will plan on eventual direct-current cardioversion after 3-4 weeks of full anticoagulation.      2.  Hypervolemia.  Maritza still appears to have some fluid retention though she reports some subjective improvement already.    Will initiate scheduled enteral furosemide 40 mg twice daily and continue to follow.    3.  Coronary artery disease.  Maritza has known coronary artery disease by virtue of CT scan this admission revealing severe coronary calcification.  ECG on presentation suggests possible prior anteroseptal infarct.  Relatively low high-sensitivity troponin values without ? change would suggest against any recent injury (24 ? 22 ng/L).  She has not had any chest pain symptoms.  Echocardiogram this admission revealed well-preserved left ventricular systolic performance without wall motion abnormality.     4.  Aortic stenosis.  This is felt moderate-severe on echocardiogram 30 January 2024 (see Cardiac Diagnostics section below).  Will arrange for follow-up in the [facility] post dismissal from hospital.    5.  Hypertension.  Blood pressure most recently 132/69 mmHg.  Continue metoprolol as per problem #1.     6.  Dyslipidemia.  Direct LDL 1 December 2022 was 55 mg/dL.  Continue statin therapy.  For completeness we will obtain fasting lipid profile.     Primary Cardiologist: Dr. Cuate Rico (initial consultation this admission).    Subjective     Maritza  "states she had a restful night.  Continues to deny palpitations despite atrial fibrillation.  No chest pain symptoms.  Breathing again improved from admission.      Cardiac Diagnostics   Telemetry (personally reviewed): Atrial fibrillation    Echocardiogram 3 January 2024:  Normal left ventricular size and systolic performance with ejection fraction of 60 to 65%.  Moderate-severe aortic stenosis.  Mean gradient is measured at 39 mmHg with peak velocity of 3.5 m/s.  Calculated valve area 0.9 cm .  Normal right ventricular size and systolic performance.  Severe left atrial enlargement.  Moderate right atrial enlargement.  Right ventricular systolic pressure relative to right atrial pressure is mildly increased.  The pulmonary artery pressures estimated to be 35-40 mmHg plus right atrial pressure.    Twelve-lead ECG (personally reviewed) 29 January 2024: Atrial fibrillation with heart rate of 175 bpm. Somewhat low voltage. Cannot rule out anteroseptal infarct.     Physical Examination       /69 (BP Location: Left arm)   Pulse 82   Temp 98.5  F (36.9  C) (Oral)   Resp 18   Ht 1.651 m (5' 5\")   Wt 98.9 kg (218 lb 0.6 oz)   SpO2 92%   BMI 36.28 kg/m          Vitals:    01/29/24 1439 01/30/24 1300   Weight: 99.8 kg (220 lb) 98.9 kg (218 lb 0.6 oz)            Intake/Output Summary (Last 24 hours) at 1/31/2024 0626  Last data filed at 1/30/2024 1958  Gross per 24 hour   Intake 766.75 ml   Output --   Net 766.75 ml       The patient is alert and oriented times three. Sclerae are anicteric. Mucosal membranes are moist.  No significant adenopathy/thyromegally appreciated. Lungs continue to reveal some fine crackles at the bases.  On cardiovascular exam, the patient has irregular S1 and S2.  Systolic murmur without change.  Abdomen is soft and non-tender. Extremities reveal no clubbing, cyanosis, with somewhat improved lower extremity edema.         Imaging      CT scan of chest 29 January 2024:  No pulmonary " "embolism.  Enlargement main pulmonary which can be seen with pulmonary hypertension.  Hypoattenuation of the left atrial appendage. While this could represent contrast mixing artifact, a echocardiogram is recommended to exclude thrombus.  Findings suggesting cardiogenic pulmonary edema with moderate right and small left pleural effusions.  Large thyroid goiter with intrathoracic extension. Recommend thyroid ultrasound for further characterization.  Cirrhotic liver morphology. Consider nonemergent right upper quadrant abdominal ultrasound for further characterization.  Coronary calcification: Severe.    Lab Results     Recent Labs   Lab 01/31/24  0514 01/30/24  0556 01/29/24  1739 01/29/24  1505 01/29/24  1505 01/29/24  1504   WBC  --   --   --   --  7.8  --    HGB  --   --   --   --  11.7  --    MCV  --   --   --   --  96  --    PLT  --   --   --   --  177  --    INR  --   --   --   --   --  1.22*    143  --   --  141  --    POTASSIUM 3.5 4.4  --   --  4.2  --    CHLORIDE 102 101  --   --  103  --    CO2 30* 31*  --   --  24  --    BUN 17.0 12.9  --   --  13.0  --    CR 0.85 0.89 0.73   < > 0.76  --    ANIONGAP 9 11  --   --  14  --    SHI 9.1 9.4  --   --  9.8  --    * 178*  --   --  208*  --     < > = values in this interval not displayed.     No results for input(s): \"NTBNPI\", \"BNP\" in the last 33099 hours.    Invalid input(s): \"NTPNP\"  No lab results found in last 7 days.    Invalid input(s): \"LDLCALC\"  Lab Results   Component Value Date     01/31/2024    CO2 30 01/31/2024    BUN 17.0 01/31/2024     Lab Results   Component Value Date    WBC 7.8 01/29/2024    HGB 11.7 01/29/2024    HCT 37.1 01/29/2024    MCV 96 01/29/2024     01/29/2024     Lab Results   Component Value Date    CHOL 152 02/03/2017    TRIG 296 02/03/2017    HDL 36 02/03/2017     Lab Results   Component Value Date    INR 1.22 01/29/2024     Lab Results   Component Value Date    TSH 1.00 01/29/2024           Current " Inpatient Scheduled Medications   Scheduled Meds:   apixaban ANTICOAGULANT  5 mg Oral BID    atorvastatin  40 mg Oral QPM    cyanocobalamin  100 mcg Oral Daily    metFORMIN  500 mg Oral BID w/meals    metoprolol tartrate  50 mg Oral BID    sodium chloride (PF)  3 mL Intracatheter Q8H     Continuous Infusions:   dilTIAZem Stopped (01/30/24 6471)            Medications Prior to Admission   Prior to Admission medications    Medication Sig Start Date End Date Taking? Authorizing Provider   atorvastatin (LIPITOR) 40 MG tablet [ATORVASTATIN (LIPITOR) 40 MG TABLET] TAKE ONE TABLET BY MOUTH DAILY AT BEDTIME 8/3/17  Yes Cristofer Ramirez MD   cyanocobalamin (VITAMIN B-12) 100 MCG tablet Take 100 mcg by mouth daily   Yes Unknown, Entered By History   fluticasone (FLONASE) 50 MCG/ACT nasal spray Spray 1 spray into both nostrils 2 times daily   Yes Unknown, Entered By History   losartan (COZAAR) 50 MG tablet Take 50 mg by mouth every evening   Yes Unknown, Entered By History   metFORMIN (GLUCOPHAGE XR) 500 MG 24 hr tablet Take 500 mg by mouth 2 times daily (with meals)   Yes Unknown, Entered By History   multivitamin (CENTRUM SILVER) tablet Take 1 tablet by mouth daily   Yes Unknown, Entered By History   PROAIR HFA 90 mcg/actuation inhaler [PROAIR HFA 90 MCG/ACTUATION INHALER] INHALE TWO PUFFS BY MOUTH EVERY 4 TO 6 HOURS AS NEEDED - MAX OF 12 PUFFS IN 24 HOURS 12/23/14  Yes Cristofer Ramirez MD   Vitamin D3 (CHOLECALCIFEROL) 25 mcg (1000 units) tablet Take 25 mcg by mouth daily   Yes Unknown, Entered By History

## 2024-01-31 NOTE — PROGRESS NOTES
[facility]    Progress Note - Hospitalist Service       Date of Admission:  1/29/2024    Assessment & Plan   Maritza Thomas is a 83 year old female admitted on 1/29/2024. She has a history of HTN, T2DM, asthma and is admitted on 1/29/2024 for afib with RVR.     Acute hypoxic respiratory failure  A-fib with RVR  Bilateral pleural effusions  Pulmonary hypertension  Aortic stenosis  Patient presented to ED for new A-fib with RVR. CT PE showed moderate right and small left pleural effusions.  Echo showed EF 60-65% with left atrium severely dilated, moderate to severe valvular aortic stenosis and pulmonary hypertension.  Pulmonary hypertension could be linked to undiagnosed TONY.  Patient is requiring 3 LPM of oxygen, she is not on any oxygen at home.  -Cardiac telemetry  -Cardiology consulted, appreciate recommendations  -Metoprolol 75 mg BID  -Apixaban 5 mg BID  -If able to optimize ventricular response, will plan on eventual direct-current cardioversion after 3-4 weeks of full anticoagulation.  -Oral furosemide 40 mg  BID  -Follow up with Valve clinic outpatient  -Fluid restriction 2L  -AM BMP  -Recommend outpatient sleep study  -Moderate consistent carb diet     Asthma  -Hold PTA albuterol in setting of tachycardia, can reevaluate if increased SOB      DMT2  Manages with metformin only.  Last A1c 6.9 on 6/2023.  Glucose mildly elevated on admission.  A1c was 7.3 on admission.  -Continue PTA metformin     Thyroid goiter  Cirrhotic liver  Incidental findings on chest CT.  Found to have large thyroid goiter with intrathoracic extension recommending thyroid US.  Also has cirrhotic liver morphology and to consider nonemergent RUQ US. Free T3, T4, thyroid peroxidase antibody all WNL. Thyroid ultrasound shows enlarged and heterogenous thyroid.  Recommend follow-up with PCP and possible referral to endocrinology.     HTN  -Hold PTA losartan in setting of normal BP and A-fib management as above  "        Diet: Fluid restriction 2000 ML FLUID  Moderate Consistent Carb (60 g CHO per Meal) Diet    DVT Prophylaxis: DOAC  Boswell Catheter: Not present  Fluids: P.o.  Lines: None     Cardiac Monitoring: ACTIVE order. Indication: Tachyarrhythmias, acute (48 hours)  Code Status: Full Code      Clinically Significant Risk Factors               # Coagulation Defect: INR = 1.22 (Ref range: 0.85 - 1.15) and/or PTT = N/A, will monitor for bleeding    # Hypertension: Noted on problem list       # DMII: A1C = 7.3 % (Ref range: <5.7 %) within past 6 months, PRESENT ON ADMISSION  # Obesity: Estimated body mass index is 36.28 kg/m  as calculated from the following:    Height as of this encounter: 1.651 m (5' 5\").    Weight as of this encounter: 98.9 kg (218 lb 0.6 oz)., PRESENT ON ADMISSION     # Financial/Environmental Concerns: none  # Asthma: noted on problem list        Disposition Plan      Expected Discharge Date: 02/02/2024      Destination: home  Discharge Comments: started on apixaban and metoprolol.        The patient's care was discussed with the Attending Physician, Dr. Meneses .    Cleo Joshi MD PGY1  Hospitalist Service  [facility]  Securely message with [employer] (more info)  Text page via [email or web address] Paging/Directory   ______________________________________________________________________    Interval History   Patient states he feels better but did not sleep very well overnight.  States she has not been coughing, and feels less short of breath.  She denies vomiting, nausea, abdominal pain, lightheadedness. She denies feelings of heart palpitation.    Physical Exam   Vital Signs: Temp: 97.6  F (36.4  C) Temp src: Oral BP: 122/82 Pulse: 99   Resp: 20 SpO2: 96 % O2 Device: Nasal cannula Oxygen Delivery: 3 LPM  Weight: 218 lbs .56 oz    GENERAL: alert and no acute distress  RESP: Bibasilar fine crackles, no wheezing, no crackles, nasal cannula  CV: Irregular rate and rhythm, systolic murmur " appreciated  ABDOMEN: soft, nontender, and bowel sounds normal  MS: no gross musculoskeletal defects noted, lower extremity edema slightly improved  PSYCH: mentation appears normal, affect normal/bright    Data     I have personally reviewed the following data over the past 24 hrs:    N/A  \   N/A   / N/A     141 102 17.0 /  145 (H)   3.5 30 (H) 0.85 \     TSH: N/A T4: N/A A1C: N/A       Imaging results reviewed over the past 24 hrs:   Recent Results (from the past 24 hour(s))   US Thyroid    Narrative    EXAM: US THYROID  LOCATION: [facility]  DATE: 1/30/2024    INDICATION: New A fib, goiter  COMPARISON: CT chest 1/29/2024  TECHNIQUE: Thyroid ultrasound.     FINDINGS:  Evaluation is limited by the size of the thyroid gland, patient body habitus, neck size, and poor sonographic penetration of the entire thyroid.    RIGHT lobe: 8.6 x 4.9 x 4.4 cm. Heterogenous echotexture.  Isthmus: 5 mm.  LEFT lobe: 3.8 x 2.0 x 2.5 cm. Heterogenous echotexture.    NECK: No cervical lymphadenopathy.    NODULES:  The inferior aspect of the right thyroid gland extends into the retrosternal region where it is suboptimally visualized. This is difficult to distinguish from an exophytic nodule versus retrosternal thyroid parenchyma.      Impression    IMPRESSION:  Technically difficult examination with very limited sonographic visualization of the retrosternal portion of the thyroid gland. The entire gland is enlarged and heterogeneous, therefore difficult to distinguish an exophytic nodule from retrosternal   parenchyma.    Nodules are characterized per  ACR Thyroid Imaging, Reporting and Data System (TI-RADS): White Paper of the ACR TI-RADS Committee  Damaso Powers et al. Journal of the American College of Radiology 2017. Volume 14 (2017), Issue 5, 587-382.

## 2024-01-31 NOTE — PLAN OF CARE
Pt arrived on unit around 1330, A&Ox4, able to make needs known. 2L NC, O2>90%, denies SOB. Remains in afib on telle, dilt paused around 1340 for HR remaining , restarted around 1800 when HR was sustains in 100s. Consistent carb diet, able to take medications whole. 2000ml fl restriction, compliant. US of thyroid performed this shift. Pt appears to be resting comfortable at end of shift.     Problem: Dysrhythmia  Goal: Normalized Cardiac Rhythm  Outcome: Progressing     Alpesh Richter RN on 1/30/2024 at 6:24 PM

## 2024-01-31 NOTE — CONSULTS
Care Management Initial Consult    General Information  Assessment completed with: Patient,    Type of CM/SW Visit: Initial Assessment    Primary Care Provider verified and updated as needed: Yes   Readmission within the last 30 days: no previous admission in last 30 days      Reason for Consult: discharge planning  Advance Care Planning:            Communication Assessment  Patient's communication style: spoken language (English or Bilingual)    Hearing Difficulty or Deaf: no   Wear Glasses or Blind: yes    Cognitive  Cognitive/Neuro/Behavioral: WDL                      Living Environment:   People in home: alone     Current living Arrangements:  ([facility])      Able to return to prior arrangements: yes       Family/Social Support:  Care provided by: self  Provides care for: no one  Marital Status:   Children          Description of Support System: Supportive, Involved         Current Resources:   Patient receiving home care services: No     Community Resources: None  Equipment currently used at home: cane, straight  Supplies currently used at home: None    Employment/Financial:  Employment Status: retired        Financial Concerns: none   Referral to Financial Worker: No       Does the patient's insurance plan have a 3 day qualifying hospital stay waiver?  No    Lifestyle & Psychosocial Needs:  Social Determinants of Health     Food Insecurity: Not on file   Depression: Not on file   Housing Stability: Not on file   Tobacco Use: Medium Risk (7/11/2021)    Patient History     Smoking Tobacco Use: Former     Smokeless Tobacco Use: Never     Passive Exposure: Not on file   Financial Resource Strain: Not on file   Alcohol Use: Not on file   Transportation Needs: Not on file   Physical Activity: Not on file   Interpersonal Safety: Not on file   Stress: Not on file   Social Connections: Not on file       Functional Status:  Prior to admission patient needed assistance:   Dependent ADLs::  Ambulation-cane  Dependent IADLs:: Independent       Mental Health Status:  Mental Health Status: No Current Concerns       Chemical Dependency Status:  Chemical Dependency Status: No Current Concerns             Values/Beliefs:  Spiritual, Cultural Beliefs, Taoist Practices, Values that affect care: no               Additional Information:  SW introduced self and CM role to patient.  Patient lives in a townhouse alone.  At baseline, patient uses a cane and reports independence with I/ADLs.  Patient identifies positive support from children and grand children.  Pt denies any concerns regarding discharge to home.  Family will transport at discharge.     CM following care progression to assist as needed with safe discharge planning and follow up on team recommendations.     BRIAN Whitehead

## 2024-01-31 NOTE — PLAN OF CARE
Dilt gtt paused at 2358. Remains in Afib. HR remains 70-90s. Will jump to 1teens w/ activity, but quickly recovers. Denies CP. Other VSS on 3L NC/oxymask. Pt able to make needs known. Call light within reach and purposeful rounding performed. Cass Prince, RN      Problem: Adult Inpatient Plan of Care  Goal: Optimal Comfort and Wellbeing  1/31/2024 0607 by Cass Prince, RN  Outcome: Progressing     Problem: Risk for Delirium  Goal: Improved Sleep  Outcome: Progressing

## 2024-02-01 ENCOUNTER — APPOINTMENT (OUTPATIENT)
Dept: RADIOLOGY | Facility: CLINIC | Age: 84
DRG: 308 | End: 2024-02-01
Attending: STUDENT IN AN ORGANIZED HEALTH CARE EDUCATION/TRAINING PROGRAM
Payer: COMMERCIAL

## 2024-02-01 LAB
ALBUMIN SERPL BCG-MCNC: 3.6 G/DL (ref 3.5–5.2)
ALBUMIN UR-MCNC: NEGATIVE MG/DL
ALP SERPL-CCNC: 75 U/L (ref 40–150)
ALT SERPL W P-5'-P-CCNC: 10 U/L (ref 0–50)
ANION GAP SERPL CALCULATED.3IONS-SCNC: 11 MMOL/L (ref 7–15)
APPEARANCE UR: CLEAR
AST SERPL W P-5'-P-CCNC: 22 U/L (ref 0–45)
BACTERIA #/AREA URNS HPF: ABNORMAL /HPF
BILIRUB DIRECT SERPL-MCNC: <0.2 MG/DL (ref 0–0.3)
BILIRUB SERPL-MCNC: 0.5 MG/DL
BILIRUB UR QL STRIP: NEGATIVE
BUN SERPL-MCNC: 16.6 MG/DL (ref 8–23)
CALCIUM SERPL-MCNC: 9.1 MG/DL (ref 8.8–10.2)
CHLORIDE SERPL-SCNC: 98 MMOL/L (ref 98–107)
COLOR UR AUTO: COLORLESS
CREAT SERPL-MCNC: 0.74 MG/DL (ref 0.51–0.95)
DEPRECATED HCO3 PLAS-SCNC: 32 MMOL/L (ref 22–29)
EGFRCR SERPLBLD CKD-EPI 2021: 79 ML/MIN/1.73M2
GLUCOSE BLDC GLUCOMTR-MCNC: 139 MG/DL (ref 70–99)
GLUCOSE SERPL-MCNC: 132 MG/DL (ref 70–99)
GLUCOSE UR STRIP-MCNC: NEGATIVE MG/DL
HGB UR QL STRIP: ABNORMAL
KETONES UR STRIP-MCNC: NEGATIVE MG/DL
LEUKOCYTE ESTERASE UR QL STRIP: NEGATIVE
MUCOUS THREADS #/AREA URNS LPF: PRESENT /LPF
NITRATE UR QL: NEGATIVE
PH UR STRIP: 5 [PH] (ref 5–7)
PLATELET # BLD AUTO: 121 10E3/UL (ref 150–450)
POTASSIUM SERPL-SCNC: 3.4 MMOL/L (ref 3.4–5.3)
PROCALCITONIN SERPL IA-MCNC: 0.03 NG/ML
PROT SERPL-MCNC: 6.6 G/DL (ref 6.4–8.3)
RBC URINE: 9 /HPF
SODIUM SERPL-SCNC: 141 MMOL/L (ref 135–145)
SP GR UR STRIP: 1.01 (ref 1–1.03)
SQUAMOUS EPITHELIAL: 5 /HPF
UROBILINOGEN UR STRIP-MCNC: <2 MG/DL
WBC URINE: <1 /HPF

## 2024-02-01 PROCEDURE — 71045 X-RAY EXAM CHEST 1 VIEW: CPT

## 2024-02-01 PROCEDURE — 80053 COMPREHEN METABOLIC PANEL: CPT

## 2024-02-01 PROCEDURE — 85049 AUTOMATED PLATELET COUNT: CPT

## 2024-02-01 PROCEDURE — 250N000009 HC RX 250

## 2024-02-01 PROCEDURE — 82040 ASSAY OF SERUM ALBUMIN: CPT

## 2024-02-01 PROCEDURE — 81001 URINALYSIS AUTO W/SCOPE: CPT

## 2024-02-01 PROCEDURE — 250N000013 HC RX MED GY IP 250 OP 250 PS 637

## 2024-02-01 PROCEDURE — 250N000013 HC RX MED GY IP 250 OP 250 PS 637: Performed by: INTERNAL MEDICINE

## 2024-02-01 PROCEDURE — 99232 SBSQ HOSP IP/OBS MODERATE 35: CPT | Mod: GC

## 2024-02-01 PROCEDURE — 84145 PROCALCITONIN (PCT): CPT

## 2024-02-01 PROCEDURE — 36415 COLL VENOUS BLD VENIPUNCTURE: CPT

## 2024-02-01 PROCEDURE — 80048 BASIC METABOLIC PNL TOTAL CA: CPT

## 2024-02-01 PROCEDURE — 250N000009 HC RX 250: Performed by: INTERNAL MEDICINE

## 2024-02-01 PROCEDURE — 210N000002 HC R&B HEART CARE

## 2024-02-01 PROCEDURE — 99232 SBSQ HOSP IP/OBS MODERATE 35: CPT | Performed by: INTERNAL MEDICINE

## 2024-02-01 RX ORDER — METOPROLOL TARTRATE 100 MG
100 TABLET ORAL 2 TIMES DAILY
Status: DISCONTINUED | OUTPATIENT
Start: 2024-02-01 | End: 2024-02-02

## 2024-02-01 RX ORDER — METOPROLOL TARTRATE 1 MG/ML
5 INJECTION, SOLUTION INTRAVENOUS ONCE
Status: COMPLETED | OUTPATIENT
Start: 2024-02-01 | End: 2024-02-01

## 2024-02-01 RX ORDER — FUROSEMIDE 40 MG
40 TABLET ORAL DAILY
Status: DISCONTINUED | OUTPATIENT
Start: 2024-02-01 | End: 2024-02-05 | Stop reason: HOSPADM

## 2024-02-01 RX ORDER — METOPROLOL TARTRATE 1 MG/ML
10 INJECTION, SOLUTION INTRAVENOUS ONCE
Status: COMPLETED | OUTPATIENT
Start: 2024-02-01 | End: 2024-02-01

## 2024-02-01 RX ADMIN — ATORVASTATIN CALCIUM 40 MG: 40 TABLET, FILM COATED ORAL at 20:20

## 2024-02-01 RX ADMIN — METOPROLOL TARTRATE 10 MG: 1 INJECTION, SOLUTION INTRAVENOUS at 20:49

## 2024-02-01 RX ADMIN — APIXABAN 5 MG: 5 TABLET, FILM COATED ORAL at 20:20

## 2024-02-01 RX ADMIN — METOPROLOL TARTRATE 100 MG: 100 TABLET, FILM COATED ORAL at 17:56

## 2024-02-01 RX ADMIN — APIXABAN 5 MG: 5 TABLET, FILM COATED ORAL at 09:01

## 2024-02-01 RX ADMIN — Medication 100 MCG: at 09:03

## 2024-02-01 RX ADMIN — METFORMIN ER 500 MG 500 MG: 500 TABLET ORAL at 09:03

## 2024-02-01 RX ADMIN — METOPROLOL TARTRATE 5 MG: 1 INJECTION, SOLUTION INTRAVENOUS at 16:45

## 2024-02-01 RX ADMIN — METOPROLOL TARTRATE 5 MG: 1 INJECTION, SOLUTION INTRAVENOUS at 09:28

## 2024-02-01 RX ADMIN — METOPROLOL TARTRATE 100 MG: 100 TABLET, FILM COATED ORAL at 09:01

## 2024-02-01 RX ADMIN — FUROSEMIDE 40 MG: 40 TABLET ORAL at 09:01

## 2024-02-01 RX ADMIN — METFORMIN ER 500 MG 500 MG: 500 TABLET ORAL at 17:55

## 2024-02-01 ASSESSMENT — ACTIVITIES OF DAILY LIVING (ADL)
ADLS_ACUITY_SCORE: 22

## 2024-02-01 NOTE — PROGRESS NOTES
[facility] HE[facility] [address], [place] [address]   [email or web address]   OFFICE: [phone]            Impression and Plan     1.  Atrial fibrillation.  Maritza presents with new diagnosis of atrial fibrillation of unknown duration.  Maritza has no awareness of the rhythm disturbance.  Ventricular response improved though still a bit suboptimal at times.  Plan:  Continue metoprolol, but will increase again today to 100 mg twice daily.  Continue apixaban 5 mg twice daily.  If able to optimize ventricular response, will plan on eventual direct-current cardioversion after 3-4 weeks of full anticoagulation.      2.  Hypervolemia.  Maritza reports vigorous/frequent passing of urine.  Weight down 1.4 kg from admission.  Need to be wary of overdiuresis vis-à-vis significant aortic stenosis. Renal function stable with creatinine 0.74 mg/dL this morning.    Continue enteral furosemide 40 mg, but will scale back to once daily.    3.  Coronary artery disease.  Maritza has known coronary artery disease by virtue of CT scan this admission revealing severe coronary calcification.  ECG on presentation suggests possible prior anteroseptal infarct.  Relatively low high-sensitivity troponin values without ? change would suggest against any recent injury (24 ? 22 ng/L).  She has not had any chest pain symptoms.  Echocardiogram this admission revealed well-preserved left ventricular systolic performance without wall motion abnormality.     4.  Aortic stenosis.  This is felt moderate-severe on echocardiogram 30 January 2024 (see Cardiac Diagnostics section below).  Will arrange for follow-up in the Valve Clinic post dismissal from hospital.    5.  Hypertension.  Blood pressure most recently 109/54 mmHg.  Continue metoprolol as per problem #1.     6.  Dyslipidemia.  Lipid profile 31 January 2024 revealed LDL 32 mg/dL and HDL 39 mg/dL.  Continue statin therapy.       Primary  "Cardiologist: Dr. Cuate Rico (initial consultation this admission).    Subjective     Maritza states she had a restful night.  Continues to deny palpitations despite atrial fibrillation.  No chest pain symptoms.  Breathing again improved from admission.  She reports vigorous urination.    Cardiac Diagnostics   Telemetry (personally reviewed): Atrial fibrillation    Echocardiogram 3 January 2024:  Normal left ventricular size and systolic performance with ejection fraction of 60 to 65%.  Moderate-severe aortic stenosis.  Mean gradient is measured at 39 mmHg with peak velocity of 3.5 m/s.  Calculated valve area 0.9 cm .  Normal right ventricular size and systolic performance.  Severe left atrial enlargement.  Moderate right atrial enlargement.  Right ventricular systolic pressure relative to right atrial pressure is mildly increased.  The pulmonary artery pressures estimated to be 35-40 mmHg plus right atrial pressure.    Twelve-lead ECG (personally reviewed) 29 January 2024: Atrial fibrillation with heart rate of 175 bpm. Somewhat low voltage. Cannot rule out anteroseptal infarct.     Physical Examination       /54   Pulse 91   Temp 98.7  F (37.1  C) (Oral)   Resp 20   Ht 1.651 m (5' 5\")   Wt 98.4 kg (216 lb 14.9 oz)   SpO2 95%   BMI 36.10 kg/m          Vitals:    01/29/24 1439 01/30/24 1300 02/01/24 0305   Weight: 99.8 kg (220 lb) 98.9 kg (218 lb 0.6 oz) 98.4 kg (216 lb 14.9 oz)            Intake/Output Summary (Last 24 hours) at 1/31/2024 0626  Last data filed at 1/30/2024 1958  Gross per 24 hour   Intake 766.75 ml   Output --   Net 766.75 ml       The patient is alert and oriented times three. Sclerae are anicteric. Mucosal membranes are moist.  No significant adenopathy/thyromegally appreciated. Lungs continue to reveal some fine crackles at the bases.  On cardiovascular exam, the patient has irregular S1 and S2.  Systolic murmur without change.  Abdomen is soft and non-tender. Extremities " "reveal no clubbing, cyanosis, with somewhat improved lower extremity edema.         Imaging      CT scan of chest 29 January 2024:  No pulmonary embolism.  Enlargement main pulmonary which can be seen with pulmonary hypertension.  Hypoattenuation of the left atrial appendage. While this could represent contrast mixing artifact, a echocardiogram is recommended to exclude thrombus.  Findings suggesting cardiogenic pulmonary edema with moderate right and small left pleural effusions.  Large thyroid goiter with intrathoracic extension. Recommend thyroid ultrasound for further characterization.  Cirrhotic liver morphology. Consider nonemergent right upper quadrant abdominal ultrasound for further characterization.  Coronary calcification: Severe.    Lab Results     Recent Labs   Lab 02/01/24  0517 01/31/24  0514 01/30/24  0556 01/29/24  1739 01/29/24  1505 01/29/24  1504   0000   WBC  --   --   --   --  7.8  --   --    HGB  --   --   --   --  11.7  --   --    MCV  --   --   --   --  96  --   --    *  --   --   --  177  --   --    INR  --   --   --   --   --  1.22*  --     141 143  --  141  --    < >   POTASSIUM 3.4 3.5 4.4  --  4.2  --    < >   CHLORIDE 98 102 101  --  103  --    < >   CO2 32* 30* 31*  --  24  --    < >   BUN 16.6 17.0 12.9  --  13.0  --    < >   CR 0.74 0.85 0.89   < > 0.76  --   --    ANIONGAP 11 9 11  --  14  --    < >   SHI 9.1 9.1 9.4  --  9.8  --    < >   * 145* 178*  --  208*  --    < >    < > = values in this interval not displayed.     No results for input(s): \"NTBNPI\", \"BNP\" in the last 75995 hours.    Invalid input(s): \"NTPNP\"  Recent Labs   Lab 01/31/24  0514   CHOL 100   TRIG 146   HDL 39*     Lab Results   Component Value Date     01/31/2024    CO2 30 01/31/2024    BUN 17.0 01/31/2024     Lab Results   Component Value Date    WBC 7.8 01/29/2024    HGB 11.7 01/29/2024    HCT 37.1 01/29/2024    MCV 96 01/29/2024     01/29/2024     Lab Results   Component Value " Date    CHOL 152 02/03/2017    TRIG 296 02/03/2017    HDL 36 02/03/2017     Lab Results   Component Value Date    INR 1.22 01/29/2024     Lab Results   Component Value Date    TSH 1.00 01/29/2024           Current Inpatient Scheduled Medications   Scheduled Meds:   apixaban ANTICOAGULANT  5 mg Oral BID    atorvastatin  40 mg Oral QPM    cyanocobalamin  100 mcg Oral Daily    furosemide  40 mg Oral BID    metFORMIN  500 mg Oral BID w/meals    metoprolol tartrate  75 mg Oral BID    sodium chloride (PF)  3 mL Intracatheter Q8H     Continuous Infusions:   dilTIAZem Stopped (01/30/24 3081)            Medications Prior to Admission   Prior to Admission medications    Medication Sig Start Date End Date Taking? Authorizing Provider   atorvastatin (LIPITOR) 40 MG tablet [ATORVASTATIN (LIPITOR) 40 MG TABLET] TAKE ONE TABLET BY MOUTH DAILY AT BEDTIME 8/3/17  Yes Cristofer Ramirez MD   cyanocobalamin (VITAMIN B-12) 100 MCG tablet Take 100 mcg by mouth daily   Yes Unknown, Entered By History   fluticasone (FLONASE) 50 MCG/ACT nasal spray Spray 1 spray into both nostrils 2 times daily   Yes Unknown, Entered By History   losartan (COZAAR) 50 MG tablet Take 50 mg by mouth every evening   Yes Unknown, Entered By History   metFORMIN (GLUCOPHAGE XR) 500 MG 24 hr tablet Take 500 mg by mouth 2 times daily (with meals)   Yes Unknown, Entered By History   multivitamin (CENTRUM SILVER) tablet Take 1 tablet by mouth daily   Yes Unknown, Entered By History   PROAIR HFA 90 mcg/actuation inhaler [PROAIR HFA 90 MCG/ACTUATION INHALER] INHALE TWO PUFFS BY MOUTH EVERY 4 TO 6 HOURS AS NEEDED - MAX OF 12 PUFFS IN 24 HOURS 12/23/14  Yes Cristofer Ramirez MD   Vitamin D3 (CHOLECALCIFEROL) 25 mcg (1000 units) tablet Take 25 mcg by mouth daily   Yes Unknown, Entered By History

## 2024-02-01 NOTE — PLAN OF CARE
Problem: Gas Exchange Impaired  Goal: Optimal Gas Exchange  Outcome: Progressing     Problem: Dysrhythmia  Goal: Normalized Cardiac Rhythm  2/1/2024 1637 by Jessy Mast RN  Outcome: Progressing  2/1/2024 1637 by Jessy Mast RN  Outcome: Progressing   Goal Outcome Evaluation:    Patient is alert and oriented. Pt denies pain or SOB.     0928: Pt -170s. Pt asymptomatic. Cardiology notified. One time IV metoprolol ordered and given. HR controlled. 80'100s.    1635: Pt HR up to the 120s-150s. Cardiolgy notifed again and another IV metoprolol ordered. Per cardiology, PO metoprolol ok'd to be given early if needed.     1755: Pt HR still in the 130-140s. Per cardiology scheduled PO dose of metoprolol given early.     Jessy Mast, COMPA

## 2024-02-01 NOTE — PLAN OF CARE
Pt A&Ox4, able to make needs known. RA, denies SOB. Pt denies CP, N/V, pain. Pt remains asymptomatic with afib. Remains Afib w/RVR on telle. 5mg IV metoprolol given, little improvement in HR. HR went from 150s to 120s. Bedtime oral metoprolol given early per cardiology. Continuing to monitor HR and BP.     Alpesh Richter RN on 1/31/2024 at 6:12 PM

## 2024-02-01 NOTE — PLAN OF CARE
Pt Aox4. Denies pain. Ambulating A1. Remains on 2-3L NC. Continues to be in Afib, intermittently RVR in low 100s. Pt denies SOB or CP. No significant events this shift. Pt able to make needs known. Call light within reach and purposeful rounding performed. Cass Prince RN      Problem: Adult Inpatient Plan of Care  Goal: Absence of Hospital-Acquired Illness or Injury  Outcome: Progressing     Problem: Adult Inpatient Plan of Care  Goal: Optimal Comfort and Wellbeing  Outcome: Progressing

## 2024-02-01 NOTE — PROGRESS NOTES
[facility]    Progress Note - Hospitalist Service       Date of Admission:  1/29/2024    Assessment & Plan   Maritza Thomas is a 83 year old female admitted on 1/29/2024. She has a history of HTN, T2DM, asthma and is admitted on 1/29/2024 for afib with RVR.     Acute hypoxic respiratory failure  A-fib with RVR  Bilateral pleural effusions  Pulmonary hypertension  Aortic stenosis  Patient presented to ED for new A-fib with RVR. CT PE showed moderate right and small left pleural effusions.  Echo showed EF 60-65% with left atrium severely dilated, moderate to severe valvular aortic stenosis and pulmonary hypertension.  Pulmonary hypertension could be linked to undiagnosed TONY.  Patient is requiring 2 LPM of oxygen, she is not on any oxygen at home.  Patient keep flipping in and out of A-fib with RVR, workup being done for possible causes of infection, however patient is asymptomatic.  -Cardiac telemetry  -Cardiology consulted, appreciate recommendations  -Metoprolol 100 mg BID  -Apixaban 5 mg BID  -If able to optimize ventricular response, plan on eventual direct-current cardioversion after 3-4 weeks of full anticoagulation.  -Oral furosemide 40 mg daily  -Follow up with Valve clinic outpatient  -Fluid restriction 2L  -AM BMP  -Recommend outpatient sleep study  -Moderate consistent carb diet  -LFT, UA, chest xray, procalcitonin to look for infection     Asthma  -Hold PTA albuterol in setting of tachycardia, can reevaluate if increased SOB      DMT2  Manages with metformin only.  Last A1c 6.9 on 6/2023.  Glucose mildly elevated on admission.  A1c was 7.3 on admission.  -Continue PTA metformin     Thyroid goiter  Cirrhotic liver  Incidental findings on chest CT.  Found to have large thyroid goiter with intrathoracic extension recommending thyroid US.  Also has cirrhotic liver morphology and to consider nonemergent RUQ US. Free T3, T4, thyroid peroxidase antibody all WNL. Thyroid ultrasound  "shows enlarged and heterogenous thyroid.  Recommend follow-up with PCP and possible referral to endocrinology.     HTN  -Hold PTA losartan in setting of normal BP and A-fib management as above         Diet: Fluid restriction 2000 ML FLUID  Moderate Consistent Carb (60 g CHO per Meal) Diet    DVT Prophylaxis: DOAC  Boswell Catheter: Not present  Fluids: P.o.  Lines: None     Cardiac Monitoring: ACTIVE order. Indication: Tachyarrhythmias, acute (48 hours)  Code Status: Full Code      Clinically Significant Risk Factors                # Thrombocytopenia: Lowest platelets = 121 in last 2 days, will monitor for bleeding   # Hypertension: Noted on problem list       # DMII: A1C = 7.3 % (Ref range: <5.7 %) within past 6 months, PRESENT ON ADMISSION  # Obesity: Estimated body mass index is 36.1 kg/m  as calculated from the following:    Height as of this encounter: 1.651 m (5' 5\").    Weight as of this encounter: 98.4 kg (216 lb 14.9 oz)., PRESENT ON ADMISSION     # Financial/Environmental Concerns: none  # Asthma: noted on problem list        Disposition Plan     Expected Discharge Date: 02/02/2024      Destination: home  Discharge Comments: started on apixaban and metoprolol.        The patient's care was discussed with the Attending Physician, Dr. Meneses .    Cleo Joshi MD PGY1  Hospitalist Service  [facility]  Securely message with [employer] Document Preparation (more info)  Text page via [facility] Paging/Directory   ______________________________________________________________________    Interval History   Patient stating she feels less short of breath than on admission and has not been coughing.  She has no concerns and  states she \"feels fine\".  She denies vomiting, nausea, abdominal pain, lightheadedness. She denies feelings of heart palpitation.  She states she had 1 bowel movement last night.  Denies diarrhea.    Physical Exam   Vital Signs: Temp: 98  F (36.7  C) Temp src: Oral BP: 138/67 Pulse: (!) 150   " Resp: 20 SpO2: 95 % O2 Device: Nasal cannula Oxygen Delivery: 2 LPM  Weight: 216 lbs 14.92 oz    GENERAL: alert and no acute distress  RESP: Bibasilar fine crackles, no wheezing, no crackles, nasal cannula  CV: Irregular rhythm and tachycardic, systolic murmur appreciated  ABDOMEN: soft, nontender, and bowel sounds normal  MS: no gross musculoskeletal defects noted, lower extremity edema improved  PSYCH: mentation appears normal, affect normal/bright    Data     I have personally reviewed the following data over the past 24 hrs:    N/A  \   N/A   / 121 (L)     141 98 16.6 /  132 (H)   3.4 32 (H) 0.74 \       Imaging results reviewed over the past 24 hrs:   No results found for this or any previous visit (from the past 24 hour(s)).

## 2024-02-02 LAB
ANION GAP SERPL CALCULATED.3IONS-SCNC: 8 MMOL/L (ref 7–15)
BUN SERPL-MCNC: 21.5 MG/DL (ref 8–23)
CALCIUM SERPL-MCNC: 9.1 MG/DL (ref 8.8–10.2)
CHLORIDE SERPL-SCNC: 97 MMOL/L (ref 98–107)
CREAT SERPL-MCNC: 0.73 MG/DL (ref 0.51–0.95)
DEPRECATED HCO3 PLAS-SCNC: 37 MMOL/L (ref 22–29)
EGFRCR SERPLBLD CKD-EPI 2021: 80 ML/MIN/1.73M2
GLUCOSE SERPL-MCNC: 129 MG/DL (ref 70–99)
MAGNESIUM SERPL-MCNC: 1.4 MG/DL (ref 1.7–2.3)
MAGNESIUM SERPL-MCNC: 2.1 MG/DL (ref 1.7–2.3)
POTASSIUM SERPL-SCNC: 3.2 MMOL/L (ref 3.4–5.3)
POTASSIUM SERPL-SCNC: 4.1 MMOL/L (ref 3.4–5.3)
SODIUM SERPL-SCNC: 142 MMOL/L (ref 135–145)

## 2024-02-02 PROCEDURE — 36415 COLL VENOUS BLD VENIPUNCTURE: CPT | Performed by: STUDENT IN AN ORGANIZED HEALTH CARE EDUCATION/TRAINING PROGRAM

## 2024-02-02 PROCEDURE — 250N000009 HC RX 250

## 2024-02-02 PROCEDURE — 99232 SBSQ HOSP IP/OBS MODERATE 35: CPT | Performed by: INTERNAL MEDICINE

## 2024-02-02 PROCEDURE — 210N000002 HC R&B HEART CARE

## 2024-02-02 PROCEDURE — 83735 ASSAY OF MAGNESIUM: CPT | Performed by: STUDENT IN AN ORGANIZED HEALTH CARE EDUCATION/TRAINING PROGRAM

## 2024-02-02 PROCEDURE — 250N000011 HC RX IP 250 OP 636: Performed by: STUDENT IN AN ORGANIZED HEALTH CARE EDUCATION/TRAINING PROGRAM

## 2024-02-02 PROCEDURE — 99232 SBSQ HOSP IP/OBS MODERATE 35: CPT | Mod: GC | Performed by: STUDENT IN AN ORGANIZED HEALTH CARE EDUCATION/TRAINING PROGRAM

## 2024-02-02 PROCEDURE — 80048 BASIC METABOLIC PNL TOTAL CA: CPT

## 2024-02-02 PROCEDURE — 84132 ASSAY OF SERUM POTASSIUM: CPT | Performed by: STUDENT IN AN ORGANIZED HEALTH CARE EDUCATION/TRAINING PROGRAM

## 2024-02-02 PROCEDURE — 36415 COLL VENOUS BLD VENIPUNCTURE: CPT

## 2024-02-02 PROCEDURE — 250N000013 HC RX MED GY IP 250 OP 250 PS 637

## 2024-02-02 PROCEDURE — 250N000013 HC RX MED GY IP 250 OP 250 PS 637: Performed by: INTERNAL MEDICINE

## 2024-02-02 PROCEDURE — 250N000013 HC RX MED GY IP 250 OP 250 PS 637: Performed by: STUDENT IN AN ORGANIZED HEALTH CARE EDUCATION/TRAINING PROGRAM

## 2024-02-02 RX ORDER — POTASSIUM CHLORIDE 1500 MG/1
40 TABLET, EXTENDED RELEASE ORAL ONCE
Status: COMPLETED | OUTPATIENT
Start: 2024-02-02 | End: 2024-02-02

## 2024-02-02 RX ORDER — METOPROLOL TARTRATE 1 MG/ML
5 INJECTION, SOLUTION INTRAVENOUS ONCE
Status: COMPLETED | OUTPATIENT
Start: 2024-02-02 | End: 2024-02-02

## 2024-02-02 RX ORDER — MAGNESIUM SULFATE 4 G/50ML
4 INJECTION INTRAVENOUS ONCE
Status: COMPLETED | OUTPATIENT
Start: 2024-02-02 | End: 2024-02-02

## 2024-02-02 RX ADMIN — POTASSIUM CHLORIDE 40 MEQ: 1500 TABLET, EXTENDED RELEASE ORAL at 08:55

## 2024-02-02 RX ADMIN — METOPROLOL TARTRATE 5 MG: 1 INJECTION, SOLUTION INTRAVENOUS at 18:17

## 2024-02-02 RX ADMIN — FUROSEMIDE 40 MG: 40 TABLET ORAL at 08:54

## 2024-02-02 RX ADMIN — APIXABAN 5 MG: 5 TABLET, FILM COATED ORAL at 19:43

## 2024-02-02 RX ADMIN — MAGNESIUM SULFATE HEPTAHYDRATE 4 G: 4 INJECTION, SOLUTION INTRAVENOUS at 17:23

## 2024-02-02 RX ADMIN — Medication 100 MCG: at 08:58

## 2024-02-02 RX ADMIN — METOPROLOL TARTRATE 150 MG: 100 TABLET, FILM COATED ORAL at 19:43

## 2024-02-02 RX ADMIN — METOPROLOL TARTRATE 125 MG: 100 TABLET, FILM COATED ORAL at 08:54

## 2024-02-02 RX ADMIN — APIXABAN 5 MG: 5 TABLET, FILM COATED ORAL at 08:55

## 2024-02-02 RX ADMIN — ATORVASTATIN CALCIUM 40 MG: 40 TABLET, FILM COATED ORAL at 19:43

## 2024-02-02 RX ADMIN — METFORMIN ER 500 MG 500 MG: 500 TABLET ORAL at 17:23

## 2024-02-02 RX ADMIN — METFORMIN ER 500 MG 500 MG: 500 TABLET ORAL at 08:58

## 2024-02-02 ASSESSMENT — ACTIVITIES OF DAILY LIVING (ADL)
ADLS_ACUITY_SCORE: 22
ADLS_ACUITY_SCORE: 24
ADLS_ACUITY_SCORE: 22
ADLS_ACUITY_SCORE: 24
ADLS_ACUITY_SCORE: 24
ADLS_ACUITY_SCORE: 22
ADLS_ACUITY_SCORE: 22
ADLS_ACUITY_SCORE: 24

## 2024-02-02 NOTE — PROGRESS NOTES
[facility]    Progress Note - Hospitalist Service       Date of Admission:  1/29/2024    Assessment & Plan   Maritza Thomas is a 83 year old female admitted on 1/29/2024. She has a history of HTN, T2DM, asthma and is admitted on 1/29/2024 for afib with RVR.     Acute hypoxic respiratory failure  A-fib with RVR  Bilateral pleural effusions  Pulmonary hypertension  Aortic stenosis  Patient presented to ED for new A-fib with RVR. CT PE showed moderate right and small left pleural effusions.  Echo showed EF 60-65% with left atrium severely dilated, moderate to severe valvular aortic stenosis and pulmonary hypertension.  Pulmonary hypertension could be linked to undiagnosed TONY.  Patient is requiring 3 LPM of oxygen, she is not on any oxygen at home.  Infectious workup done with a negative UA, negative chest x-ray, negative procalcitonin.  Continuing to titrate medication to control ventricular response.  -Cardiac telemetry  -Cardiology consulted, appreciate recommendations  -Increase metoprolol to 125 mg BID  -Apixaban 5 mg BID  -If able to optimize ventricular response, plan on eventual direct-current cardioversion after 3-4 weeks of full anticoagulation.  -Oral furosemide 40 mg daily  -Follow up with Valve clinic outpatient  -Fluid restriction 2L  -AM BMP  -Recommend outpatient sleep study  -Moderate consistent carb diet     Asthma  -Holding PTA albuterol in setting of tachycardia, can reevaluate if increased SOB.  Considering adding levoalbuterol.      DMT2  Manages with metformin only.  Last A1c 6.9 on 6/2023.  Glucose mildly elevated on admission.  A1c was 7.3 on admission.  -Continue PTA metformin     Thyroid goiter  Cirrhotic liver  Incidental findings on chest CT.  Found to have large thyroid goiter with intrathoracic extension recommending thyroid US.  Also has cirrhotic liver morphology and to consider nonemergent RUQ US. Free T3, T4, thyroid peroxidase antibody all WNL. Thyroid  "ultrasound shows enlarged and heterogenous thyroid.  Recommend follow-up with PCP and possible referral to endocrinology.     HTN  -Hold PTA losartan in setting of normal BP and A-fib management as above         Diet: Fluid restriction 2000 ML FLUID  Moderate Consistent Carb (60 g CHO per Meal) Diet    DVT Prophylaxis: DOAC  Boswell Catheter: Not present  Fluids: P.o.  Lines: None     Cardiac Monitoring: ACTIVE order. Indication: Tachyarrhythmias, acute (48 hours)  Code Status: Full Code      Clinically Significant Risk Factors        # Hypokalemia: Lowest K = 3.2 mmol/L in last 2 days, will replace as needed         # Thrombocytopenia: Lowest platelets = 121 in last 2 days, will monitor for bleeding   # Hypertension: Noted on problem list       # DMII: A1C = 7.3 % (Ref range: <5.7 %) within past 6 months, PRESENT ON ADMISSION  # Obesity: Estimated body mass index is 35.01 kg/m  as calculated from the following:    Height as of this encounter: 1.651 m (5' 5\").    Weight as of this encounter: 95.4 kg (210 lb 6.4 oz)., PRESENT ON ADMISSION     # Financial/Environmental Concerns: none  # Asthma: noted on problem list        Disposition Plan      Expected Discharge Date: 02/04/2024      Destination: home  Discharge Comments: started on apixaban and metoprolol. Adjusting meds. still requiring IV meds for rate control in addition to increasing PO doses        The patient's care was discussed with the Attending Physician, Dr. Meneses .    Cleo Joshi MD PGY1  Hospitalist Service  [facility]  Securely message with [employer] (more info)  Text page via [employer] Paging/Directory   ______________________________________________________________________    Interval History   Overnight patient needed an additional IV metoprolol dose due to increased heart rate, she was asymptomatic.  She denies vomiting, nausea, abdominal pain, lightheadedness. She denies feelings of heart palpitation.  She states she " slept poorly last night due to frequent interruptions and cycling blood pressure cuff.    Physical Exam   Vital Signs: Temp: 97.3  F (36.3  C) Temp src: Oral BP: (!) 148/81 Pulse: 98   Resp: 18 SpO2: 95 % O2 Device: Nasal cannula Oxygen Delivery: 3 LPM  Weight: 210 lbs 6.4 oz    GENERAL: alert and no acute distress  RESP: Bibasilar fine crackles, diffuse expiratory wheezing, nasal cannula  CV: Irregular rhythm and tachycardic, systolic murmur appreciated  ABDOMEN: soft, nontender, and bowel sounds normal  MS: no gross musculoskeletal defects noted, lower extremity edema improved  PSYCH: mentation appears normal, affect normal/bright    Data     I have personally reviewed the following data over the past 24 hrs:    N/A  \   N/A   / N/A     142 97 (L) 21.5 /  129 (H)   3.2 (L) 37 (H) 0.73 \     ALT: N/A AST: N/A AP: N/A TBILI: N/A   ALB: N/A TOT PROTEIN: N/A LIPASE: N/A     Procal: N/A CRP: N/A Lactic Acid: N/A         Imaging results reviewed over the past 24 hrs:   Recent Results (from the past 24 hour(s))   XR Chest Port 1 View    Narrative    EXAM: XR CHEST PORT 1 VIEW  LOCATION: [facility]  DATE: 2/1/2024    INDICATION: Shortness of breath, hypoxic, AFIB w RVR, crackles on exam  COMPARISON: CT 01/29/2024      Impression    IMPRESSION: Small bilateral pleural effusions with adjacent bibasilar opacities which likely reflect atelectasis. Cardiomegaly with mild central vascular congestion and interstitial edema. These findings are similar to the comparison CT chest exam and   likely reflect CHF.

## 2024-02-02 NOTE — PLAN OF CARE
Problem: Dysrhythmia  Goal: Normalized Cardiac Rhythm  Outcome: Progressing     Problem: Adult Inpatient Plan of Care  Goal: Absence of Hospital-Acquired Illness or Injury  Intervention: Identify and Manage Fall Risk  Recent Flowsheet Documentation  Taken 2/2/2024 0400 by Jazmín Rivera RN  Safety Promotion/Fall Prevention:   clutter free environment maintained   mobility aid in reach   nonskid shoes/slippers when out of bed   room door open   room near nurse's station   safety round/check completed  Taken 2/1/2024 2000 by Jazmín Rivera RN  Safety Promotion/Fall Prevention:   clutter free environment maintained   mobility aid in reach   nonskid shoes/slippers when out of bed   room door open   room near nurse's station   safety round/check completed  Intervention: Prevent Skin Injury  Recent Flowsheet Documentation  Taken 2/2/2024 0400 by Jazmín Rivera RN  Body Position: position changed independently   Goal Outcome Evaluation:     Pt Afib RVR this evening. Rate up to 150 while at rest. Asymptomatic. BFM notified.   10mg IV metoprolol given per order. Pt remains at a rate [id number] the remainder of the shift. Safety rounds completed. Pt resting between cares. Bed alarm on for safety. Will continue to monitor and follow plan of care.  Jazmín Rivera RN    AM K+ 3.2. BFM notified. K+ protocol initiated and ran.

## 2024-02-02 NOTE — PROGRESS NOTES
[facility] [address], [place] [address]   [email or web address]   OFFICE: [phone]            Impression and Plan     1.  Atrial fibrillation.  Maritza presents with new diagnosis of atrial fibrillation of unknown duration.  Maritza has no awareness of the rhythm disturbance.  Ventricular response improved though still a bit suboptimal at times.  Plan:  Continue metoprolol, but will increase further to 125 mg twice daily.  Continue apixaban 5 mg twice daily.  Will plan on eventual direct-current cardioversion after 3-4 weeks of full anticoagulation.      2.  Hypervolemia. Maritza had 2.1 L urine output documented through the day yesterday and additional 0.4 L urine output thus far this morning.  Weight down 4.4 kg from admission.  Renal function stable with creatinine 0.73 mg/dL this morning.    Continue enteral furosemide 40 mg daily    3.  Coronary artery disease.  Maritza has known coronary artery disease by virtue of CT scan this admission revealing severe coronary calcification.  ECG on presentation suggests possible prior anteroseptal infarct.  Relatively low high-sensitivity troponin values without ? change would suggest against any recent injury (24 ? 22 ng/L).  She has not had any chest pain symptoms.  Echocardiogram this admission revealed well-preserved left ventricular systolic performance without wall motion abnormality.     4.  Aortic stenosis.  This is felt moderate-severe on echocardiogram 30 January 2024 (see Cardiac Diagnostics section below).  Will arrange for follow-up in the Valve Clinic post dismissal from hospital.    5.  Hypertension.  Blood pressure most recently 131/63 mmHg.  Continue metoprolol as per problem #1.     6.  Dyslipidemia.  Lipid profile 31 January 2024 revealed LDL 32 mg/dL and HDL 39 mg/dL.  Continue statin therapy.       Primary Cardiologist: Dr. Cuate Rico (initial consultation this  "admission).    Subjective     Continues to deny palpitations despite atrial fibrillation.  No chest pain symptoms.  Breathing again improved from admission.     Cardiac Diagnostics   Telemetry (personally reviewed): Atrial fibrillation    Echocardiogram 3 January 2024:  Normal left ventricular size and systolic performance with ejection fraction of 60 to 65%.  Moderate-severe aortic stenosis.  Mean gradient is measured at 39 mmHg with peak velocity of 3.5 m/s.  Calculated valve area 0.9 cm .  Normal right ventricular size and systolic performance.  Severe left atrial enlargement.  Moderate right atrial enlargement.  Right ventricular systolic pressure relative to right atrial pressure is mildly increased.  The pulmonary artery pressures estimated to be 35-40 mmHg plus right atrial pressure.    Twelve-lead ECG (personally reviewed) 29 January 2024: Atrial fibrillation with heart rate of 175 bpm. Somewhat low voltage. Cannot rule out anteroseptal infarct.     Physical Examination       /63 (BP Location: Left arm)   Pulse 100   Temp 98  F (36.7  C) (Oral)   Resp 16   Ht 1.651 m (5' 5\")   Wt 95.4 kg (210 lb 6.4 oz)   SpO2 94%   BMI 35.01 kg/m          Vitals:    01/29/24 1439 01/30/24 1300 02/01/24 0305 02/02/24 0327   Weight: 99.8 kg (220 lb) 98.9 kg (218 lb 0.6 oz) 98.4 kg (216 lb 14.9 oz) 95.4 kg (210 lb 6.4 oz)            Intake/Output Summary (Last 24 hours) at 1/31/2024 0626  Last data filed at 1/30/2024 1958  Gross per 24 hour   Intake 766.75 ml   Output --   Net 766.75 ml       The patient is alert and oriented times three. Sclerae are anicteric. Mucosal membranes are moist.  No significant adenopathy/thyromegally appreciated. Lungs continue to reveal some fine crackles at the bases.  On cardiovascular exam, the patient has irregular S1 and S2.  Systolic murmur without change.  Abdomen is soft and non-tender. Extremities reveal no clubbing, cyanosis, with somewhat improved lower extremity edema.   " "      Imaging      CT scan of chest 29 January 2024:  No pulmonary embolism.  Enlargement main pulmonary which can be seen with pulmonary hypertension.  Hypoattenuation of the left atrial appendage. While this could represent contrast mixing artifact, a echocardiogram is recommended to exclude thrombus.  Findings suggesting cardiogenic pulmonary edema with moderate right and small left pleural effusions.  Large thyroid goiter with intrathoracic extension. Recommend thyroid ultrasound for further characterization.  Cirrhotic liver morphology. Consider nonemergent right upper quadrant abdominal ultrasound for further characterization.  Coronary calcification: Severe.    Lab Results     Recent Labs   Lab 02/02/24  0556 02/01/24  1704 02/01/24  0517 01/31/24  0514 01/29/24  1739 01/29/24  1505 01/29/24  1504   WBC  --   --   --   --   --  7.8  --    HGB  --   --   --   --   --  11.7  --    MCV  --   --   --   --   --  96  --    PLT  --   --  121*  --   --  177  --    INR  --   --   --   --   --   --  1.22*     --  141 141   < > 141  --    POTASSIUM 3.2*  --  3.4 3.5   < > 4.2  --    CHLORIDE 97*  --  98 102   < > 103  --    CO2 37*  --  32* 30*   < > 24  --    BUN 21.5  --  16.6 17.0   < > 13.0  --    CR 0.73  --  0.74 0.85   < > 0.76  --    ANIONGAP 8  --  11 9   < > 14  --    SHI 9.1  --  9.1 9.1   < > 9.8  --    * 139* 132* 145*   < > 208*  --    ALBUMIN  --   --  3.6  --   --   --   --    PROTTOTAL  --   --  6.6  --   --   --   --    BILITOTAL  --   --  0.5  --   --   --   --    ALKPHOS  --   --  75  --   --   --   --    ALT  --   --  10  --   --   --   --    AST  --   --  22  --   --   --   --     < > = values in this interval not displayed.     No results for input(s): \"NTBNPI\", \"BNP\" in the last 35404 hours.    Invalid input(s): \"NTPNP\"  Recent Labs   Lab 01/31/24  0514   CHOL 100   TRIG 146   HDL 39*     Lab Results   Component Value Date     01/31/2024    CO2 30 01/31/2024    BUN 17.0 " 01/31/2024     Lab Results   Component Value Date    WBC 7.8 01/29/2024    HGB 11.7 01/29/2024    HCT 37.1 01/29/2024    MCV 96 01/29/2024     01/29/2024     Lab Results   Component Value Date    CHOL 152 02/03/2017    TRIG 296 02/03/2017    HDL 36 02/03/2017     Lab Results   Component Value Date    INR 1.22 01/29/2024     Lab Results   Component Value Date    TSH 1.00 01/29/2024           Current Inpatient Scheduled Medications   Scheduled Meds:   apixaban ANTICOAGULANT  5 mg Oral BID    atorvastatin  40 mg Oral QPM    cyanocobalamin  100 mcg Oral Daily    furosemide  40 mg Oral Daily    metFORMIN  500 mg Oral BID w/meals    metoprolol tartrate  100 mg Oral BID    potassium chloride  40 mEq Oral Once    sodium chloride (PF)  3 mL Intracatheter Q8H     Continuous Infusions:   dilTIAZem Stopped (01/30/24 5321)            Medications Prior to Admission   Prior to Admission medications    Medication Sig Start Date End Date Taking? Authorizing Provider   atorvastatin (LIPITOR) 40 MG tablet [ATORVASTATIN (LIPITOR) 40 MG TABLET] TAKE ONE TABLET BY MOUTH DAILY AT BEDTIME 8/3/17  Yes Cristofer Ramirez MD   cyanocobalamin (VITAMIN B-12) 100 MCG tablet Take 100 mcg by mouth daily   Yes Unknown, Entered By History   fluticasone (FLONASE) 50 MCG/ACT nasal spray Spray 1 spray into both nostrils 2 times daily   Yes Unknown, Entered By History   losartan (COZAAR) 50 MG tablet Take 50 mg by mouth every evening   Yes Unknown, Entered By History   metFORMIN (GLUCOPHAGE XR) 500 MG 24 hr tablet Take 500 mg by mouth 2 times daily (with meals)   Yes Unknown, Entered By History   multivitamin (CENTRUM SILVER) tablet Take 1 tablet by mouth daily   Yes Unknown, Entered By History   PROAIR HFA 90 mcg/actuation inhaler [PROAIR HFA 90 MCG/ACTUATION INHALER] INHALE TWO PUFFS BY MOUTH EVERY 4 TO 6 HOURS AS NEEDED - MAX OF 12 PUFFS IN 24 HOURS 12/23/14  Yes Cristofer Ramirez MD   Vitamin D3 (CHOLECALCIFEROL) 25 mcg (1000 units) tablet  Take 25 mcg by mouth daily   Yes Unknown, Entered By History

## 2024-02-03 LAB
ANION GAP SERPL CALCULATED.3IONS-SCNC: 7 MMOL/L (ref 7–15)
BUN SERPL-MCNC: 21.5 MG/DL (ref 8–23)
CALCIUM SERPL-MCNC: 9.2 MG/DL (ref 8.8–10.2)
CHLORIDE SERPL-SCNC: 99 MMOL/L (ref 98–107)
CREAT SERPL-MCNC: 0.72 MG/DL (ref 0.51–0.95)
DEPRECATED HCO3 PLAS-SCNC: 36 MMOL/L (ref 22–29)
EGFRCR SERPLBLD CKD-EPI 2021: 81 ML/MIN/1.73M2
GLUCOSE SERPL-MCNC: 137 MG/DL (ref 70–99)
MAGNESIUM SERPL-MCNC: 2 MG/DL (ref 1.7–2.3)
POTASSIUM SERPL-SCNC: 3.6 MMOL/L (ref 3.4–5.3)
SODIUM SERPL-SCNC: 142 MMOL/L (ref 135–145)

## 2024-02-03 PROCEDURE — 250N000013 HC RX MED GY IP 250 OP 250 PS 637: Performed by: INTERNAL MEDICINE

## 2024-02-03 PROCEDURE — 210N000002 HC R&B HEART CARE

## 2024-02-03 PROCEDURE — 99232 SBSQ HOSP IP/OBS MODERATE 35: CPT | Performed by: INTERNAL MEDICINE

## 2024-02-03 PROCEDURE — 83735 ASSAY OF MAGNESIUM: CPT | Performed by: STUDENT IN AN ORGANIZED HEALTH CARE EDUCATION/TRAINING PROGRAM

## 2024-02-03 PROCEDURE — 99232 SBSQ HOSP IP/OBS MODERATE 35: CPT | Mod: GC | Performed by: STUDENT IN AN ORGANIZED HEALTH CARE EDUCATION/TRAINING PROGRAM

## 2024-02-03 PROCEDURE — 250N000013 HC RX MED GY IP 250 OP 250 PS 637

## 2024-02-03 PROCEDURE — 36415 COLL VENOUS BLD VENIPUNCTURE: CPT

## 2024-02-03 PROCEDURE — 80048 BASIC METABOLIC PNL TOTAL CA: CPT

## 2024-02-03 RX ORDER — METOPROLOL TARTRATE 25 MG/1
25 TABLET, FILM COATED ORAL ONCE
Status: COMPLETED | OUTPATIENT
Start: 2024-02-03 | End: 2024-02-03

## 2024-02-03 RX ORDER — FUROSEMIDE 40 MG
40 TABLET ORAL DAILY
Qty: 30 TABLET | Refills: 0 | OUTPATIENT
Start: 2024-02-04 | End: 2024-08-08

## 2024-02-03 RX ORDER — METOPROLOL TARTRATE 75 MG/1
150 TABLET, FILM COATED ORAL 2 TIMES DAILY
Qty: 60 TABLET | Refills: 0 | OUTPATIENT
Start: 2024-02-03 | End: 2024-08-08

## 2024-02-03 RX ADMIN — METFORMIN ER 500 MG 500 MG: 500 TABLET ORAL at 18:28

## 2024-02-03 RX ADMIN — FUROSEMIDE 40 MG: 40 TABLET ORAL at 09:37

## 2024-02-03 RX ADMIN — ATORVASTATIN CALCIUM 40 MG: 40 TABLET, FILM COATED ORAL at 21:07

## 2024-02-03 RX ADMIN — APIXABAN 5 MG: 5 TABLET, FILM COATED ORAL at 21:07

## 2024-02-03 RX ADMIN — Medication 100 MCG: at 09:37

## 2024-02-03 RX ADMIN — METOPROLOL TARTRATE 25 MG: 25 TABLET, FILM COATED ORAL at 18:59

## 2024-02-03 RX ADMIN — METOPROLOL TARTRATE 150 MG: 100 TABLET, FILM COATED ORAL at 09:37

## 2024-02-03 RX ADMIN — APIXABAN 5 MG: 5 TABLET, FILM COATED ORAL at 09:37

## 2024-02-03 RX ADMIN — METOPROLOL TARTRATE 125 MG: 100 TABLET, FILM COATED ORAL at 21:07

## 2024-02-03 RX ADMIN — METFORMIN ER 500 MG 500 MG: 500 TABLET ORAL at 09:38

## 2024-02-03 ASSESSMENT — ACTIVITIES OF DAILY LIVING (ADL)
ADLS_ACUITY_SCORE: 24

## 2024-02-03 NOTE — PROGRESS NOTES
[facility]    Progress Note - Hospitalist Service       Date of Admission:  1/29/2024    Assessment & Plan   Maritza Thomas is a 83 year old female admitted on 1/29/2024. She has a history of HTN, T2DM, asthma and is admitted on 1/29/2024 for afib with RVR.     Acute hypoxic respiratory failure  A-fib with RVR  Bilateral pleural effusions  Pulmonary hypertension  Aortic stenosis  Patient presented to ED for new A-fib with RVR. CT PE showed moderate right and small left pleural effusions.  Echo showed EF 60-65% with left atrium severely dilated, moderate to severe valvular aortic stenosis and pulmonary hypertension.  Pulmonary hypertension could be linked to undiagnosed TONY.  Patient is requiring 3 LPM of oxygen, she is not on any oxygen at home.  Infectious workup done with a negative UA, negative chest x-ray, negative procalcitonin.  Continuing to titrate medication to control ventricular response.  Patient has still been requiring additional doses of IV metoprolol overnight.  -Cardiac telemetry  -Cardiology consulted, appreciate recommendations  -Continue metoprolol 150 mg BID  -Apixaban 5 mg BID  -If able to optimize ventricular response, plan on eventual direct-current cardioversion after 3-4 weeks of full anticoagulation.  -Oral furosemide 40 mg daily  -Follow up with Valve clinic outpatient  -Fluid restriction 2L  -AM BMP  -Recommend outpatient sleep study  -Moderate consistent carb diet     Hypomagnesemia  Hypokalemia  -Magnesium and potassium replacement protocols    Asthma  -Holding PTA albuterol in setting of tachycardia, can reevaluate if increased SOB.  Considering adding levoalbuterol.      DMT2  Manages with metformin only.  Last A1c 6.9 on 6/2023.  Glucose mildly elevated on admission.  A1c was 7.3 on admission.  -Continue PTA metformin     Thyroid goiter  Cirrhotic liver  Incidental findings on chest CT.  Found to have large thyroid goiter with intrathoracic extension  "recommending thyroid US.  Also has cirrhotic liver morphology and to consider nonemergent RUQ US. Free T3, T4, thyroid peroxidase antibody all WNL. Thyroid ultrasound shows enlarged and heterogenous thyroid.  Recommend follow-up with PCP and possible referral to endocrinology.     HTN  -Hold PTA losartan in setting of normal BP and A-fib management as above         Diet: Fluid restriction 2000 ML FLUID  Moderate Consistent Carb (60 g CHO per Meal) Diet    DVT Prophylaxis: DOAC  Boswell Catheter: Not present  Fluids: P.o.  Lines: None     Cardiac Monitoring: ACTIVE order. Indication: Tachyarrhythmias, acute (48 hours)  Code Status: Full Code      Clinically Significant Risk Factors        # Hypokalemia: Lowest K = 3.2 mmol/L in last 2 days, will replace as needed     # Hypomagnesemia: Lowest Mg = 1.4 mg/dL in last 2 days, will replace as needed       # Hypertension: Noted on problem list       # DMII: A1C = 7.3 % (Ref range: <5.7 %) within past 6 months   # Obesity: Estimated body mass index is 35.56 kg/m  as calculated from the following:    Height as of this encounter: 1.651 m (5' 5\").    Weight as of this encounter: 96.9 kg (213 lb 11.2 oz).      # Financial/Environmental Concerns: none  # Asthma: noted on problem list        Disposition Plan      Expected Discharge Date: 02/04/2024      Destination: home  Discharge Comments: started on apixaban and metoprolol. Adjusting meds. still requiring IV meds for rate control in addition to increasing PO doses        The patient's care was discussed with the Attending Physician, Dr. Hurley .    Cleo Joshi MD PGY1  Hospitalist Service  [facility]  Securely message with [employer] (more info)  Text page via [employer] Paging/Directory   ______________________________________________________________________    Interval History   Overnight patient needed an additional IV metoprolol dose due to increased heart rate, she was asymptomatic.  She denies " vomiting, nausea, abdominal pain, lightheadedness. She denies feelings of heart palpitation.  Last bowel movement 2/2.    Physical Exam   Vital Signs: Temp: 98.5  F (36.9  C) Temp src: Oral BP: 135/76 Pulse: 112   Resp: 18 SpO2: 92 % O2 Device: Nasal cannula Oxygen Delivery: 2 LPM  Weight: 213 lbs 11.2 oz    GENERAL: alert and no acute distress  RESP: No crackles, wheezing or rhonchi, nasal cannula  CV: Irregular rhythm and tachycardic, systolic murmur appreciated  ABDOMEN: soft, nontender, and bowel sounds normal  MS: no gross musculoskeletal defects noted, lower extremity edema improved  PSYCH: mentation appears normal, affect normal/bright    Data     I have personally reviewed the following data over the past 24 hrs:    N/A  \   N/A   / N/A     142 99 21.5 /  137 (H)   3.6 36 (H) 0.72 \       Imaging results reviewed over the past 24 hrs:   No results found for this or any previous visit (from the past 24 hour(s)).

## 2024-02-03 NOTE — PROGRESS NOTES
[facility] HEART CARE [address], [place] [address]   [email or web address]   OFFICE: [phone]            Impression and Plan     1.  Atrial fibrillation.  Maritza presents with new diagnosis of atrial fibrillation of unknown duration.  Maritza has no awareness of the rhythm disturbance.  Ventricular response continues to improve.  Plan:  Continue metoprolol (increased further last evening to 150 mg twice daily).  Continue apixaban 5 mg twice daily.  Will plan on eventual direct-current cardioversion after 3-4 weeks of full anticoagulation.      2.  Hypervolemia.  Subjectively and objectively improved renal function stable with creatinine 0.72 mg/dL this morning.    Continue enteral furosemide 40 mg daily    3.  Coronary artery disease.  Maritza has known coronary artery disease by virtue of CT scan this admission revealing severe coronary calcification.  ECG on presentation suggests possible prior anteroseptal infarct.  Relatively low high-sensitivity troponin values without ? change would suggest against any recent injury (24 ? 22 ng/L).  She has not had any chest pain symptoms.  Echocardiogram this admission revealed well-preserved left ventricular systolic performance without wall motion abnormality.     4.  Aortic stenosis.  This is felt moderate-severe on echocardiogram 30 January 2024 (see Cardiac Diagnostics section below).  Will arrange for follow-up in the Valve Clinic post dismissal from hospital.    5.  Hypertension.  Blood pressure most recently 127/89 mmHg.  Continue metoprolol as per problem #1.     6.  Dyslipidemia.  Lipid profile 31 January 2024 revealed LDL 32 mg/dL and HDL 39 mg/dL.  Continue statin therapy.    Hopefully with yesterday's increased dose of metoprolol heart rate will be fairly reasonable through the day today.  If so, could likely dismiss from hospital.  I will make arrangements for Maritza to follow-up in the Atrial Fibrillation  "Clinic in approximately 2 weeks.  Will also make arrangements for follow-up in the Valve Clinic vis-à-vis aortic stenosis (both follow-up requests already submitted through Epic).     Primary Cardiologist: Dr. Cuate Rico (initial consultation this admission).    Subjective     Continues to deny palpitations despite atrial fibrillation.  No chest pain symptoms.  Breathing again improved from admission.     Cardiac Diagnostics   Telemetry (personally reviewed): Atrial fibrillation    Echocardiogram 3 January 2024:  Normal left ventricular size and systolic performance with ejection fraction of 60 to 65%.  Moderate-severe aortic stenosis.  Mean gradient is measured at 39 mmHg with peak velocity of 3.5 m/s.  Calculated valve area 0.9 cm .  Normal right ventricular size and systolic performance.  Severe left atrial enlargement.  Moderate right atrial enlargement.  Right ventricular systolic pressure relative to right atrial pressure is mildly increased.  The pulmonary artery pressures estimated to be 35-40 mmHg plus right atrial pressure.    Twelve-lead ECG (personally reviewed) 29 January 2024: Atrial fibrillation with heart rate of 175 bpm. Somewhat low voltage. Cannot rule out anteroseptal infarct.     Physical Examination       /89 (BP Location: Left arm)   Pulse 107   Temp 97.9  F (36.6  C) (Oral)   Resp 16   Ht 1.651 m (5' 5\")   Wt 96.9 kg (213 lb 11.2 oz)   SpO2 91%   BMI 35.56 kg/m          Vitals:    01/29/24 1439 01/30/24 1300 02/01/24 0305 02/02/24 0327   Weight: 99.8 kg (220 lb) 98.9 kg (218 lb 0.6 oz) 98.4 kg (216 lb 14.9 oz) 95.4 kg (210 lb 6.4 oz)    02/03/24 0638   Weight: 96.9 kg (213 lb 11.2 oz)            Intake/Output Summary (Last 24 hours) at 1/31/2024 0626  Last data filed at 1/30/2024 1958  Gross per 24 hour   Intake 766.75 ml   Output --   Net 766.75 ml       The patient is alert and oriented times three. Sclerae are anicteric. Mucosal membranes are moist.  No significant " adenopathy/thyromegally appreciated. Lungs are clear.  On cardiovascular exam, the patient has irregular S1 and S2.  Systolic murmur without change.  Abdomen is soft and non-tender. Extremities reveal no clubbing, cyanosis.         Imaging      CT scan of chest 29 January 2024:  No pulmonary embolism.  Enlargement main pulmonary which can be seen with pulmonary hypertension.  Hypoattenuation of the left atrial appendage. While this could represent contrast mixing artifact, a echocardiogram is recommended to exclude thrombus.  Findings suggesting cardiogenic pulmonary edema with moderate right and small left pleural effusions.  Large thyroid goiter with intrathoracic extension. Recommend thyroid ultrasound for further characterization.  Cirrhotic liver morphology. Consider nonemergent right upper quadrant abdominal ultrasound for further characterization.  Coronary calcification: Severe.    Lab Results     Recent Labs   Lab 02/03/24  0607 02/02/24  1125 02/02/24  0556 02/01/24  1704 02/01/24  0517 01/29/24  1739 01/29/24  1505 01/29/24  1504   WBC  --   --   --   --   --   --  7.8  --    HGB  --   --   --   --   --   --  11.7  --    MCV  --   --   --   --   --   --  96  --    PLT  --   --   --   --  121*  --  177  --    INR  --   --   --   --   --   --   --  1.22*     --  142  --  141   < > 141  --    POTASSIUM 3.6 4.1 3.2*  --  3.4   < > 4.2  --    CHLORIDE 99  --  97*  --  98   < > 103  --    CO2 36*  --  37*  --  32*   < > 24  --    BUN 21.5  --  21.5  --  16.6   < > 13.0  --    CR 0.72  --  0.73  --  0.74   < > 0.76  --    ANIONGAP 7  --  8  --  11   < > 14  --    SHI 9.2  --  9.1  --  9.1   < > 9.8  --    *  --  129* 139* 132*   < > 208*  --    ALBUMIN  --   --   --   --  3.6  --   --   --    PROTTOTAL  --   --   --   --  6.6  --   --   --    BILITOTAL  --   --   --   --  0.5  --   --   --    ALKPHOS  --   --   --   --  75  --   --   --    ALT  --   --   --   --  10  --   --   --    AST  --   --    "--   --  22  --   --   --     < > = values in this interval not displayed.     No results for input(s): \"NTBNPI\", \"BNP\" in the last 53651 hours.    Invalid input(s): \"NTPNP\"  Recent Labs   Lab 01/31/24  0514   CHOL 100   TRIG 146   HDL 39*     Lab Results   Component Value Date     01/31/2024    CO2 30 01/31/2024    BUN 17.0 01/31/2024     Lab Results   Component Value Date    WBC 7.8 01/29/2024    HGB 11.7 01/29/2024    HCT 37.1 01/29/2024    MCV 96 01/29/2024     01/29/2024     Lab Results   Component Value Date    CHOL 152 02/03/2017    TRIG 296 02/03/2017    HDL 36 02/03/2017     Lab Results   Component Value Date    INR 1.22 01/29/2024     Lab Results   Component Value Date    TSH 1.00 01/29/2024           Current Inpatient Scheduled Medications   Scheduled Meds:   apixaban ANTICOAGULANT  5 mg Oral BID    atorvastatin  40 mg Oral QPM    cyanocobalamin  100 mcg Oral Daily    furosemide  40 mg Oral Daily    metFORMIN  500 mg Oral BID w/meals    metoprolol tartrate  150 mg Oral BID    sodium chloride (PF)  3 mL Intracatheter Q8H     Continuous Infusions:   dilTIAZem Stopped (01/30/24 1223)            Medications Prior to Admission   Prior to Admission medications    Medication Sig Start Date End Date Taking? Authorizing Provider   atorvastatin (LIPITOR) 40 MG tablet [ATORVASTATIN (LIPITOR) 40 MG TABLET] TAKE ONE TABLET BY MOUTH DAILY AT BEDTIME 8/3/17  Yes Cristofer Ramirez MD   cyanocobalamin (VITAMIN B-12) 100 MCG tablet Take 100 mcg by mouth daily   Yes Unknown, Entered By History   fluticasone (FLONASE) 50 MCG/ACT nasal spray Spray 1 spray into both nostrils 2 times daily   Yes Unknown, Entered By History   losartan (COZAAR) 50 MG tablet Take 50 mg by mouth every evening   Yes Unknown, Entered By History   metFORMIN (GLUCOPHAGE XR) 500 MG 24 hr tablet Take 500 mg by mouth 2 times daily (with meals)   Yes Unknown, Entered By History   multivitamin (CENTRUM SILVER) tablet Take 1 tablet by mouth " daily   Yes Unknown, Entered By History   PROAIR HFA 90 mcg/actuation inhaler [PROAIR HFA 90 MCG/ACTUATION INHALER] INHALE TWO PUFFS BY MOUTH EVERY 4 TO 6 HOURS AS NEEDED - MAX OF 12 PUFFS IN 24 HOURS 12/23/14  Yes Cristofer Ramirez MD   Vitamin D3 (CHOLECALCIFEROL) 25 mcg (1000 units) tablet Take 25 mcg by mouth daily   Yes Unknown, Entered By History

## 2024-02-03 NOTE — PLAN OF CARE
VSS, afebrile. Tele Afib, more controlled. Rates 's while at rest. Does jump to 140-160 at max with ambulation. Pt denies SOB, but does appear more short after talking or up to the bathroom. Remains on 2L via NC to maintain O2 sats.    Goal Outcome Evaluation:    Problem: Gas Exchange Impaired  Goal: Optimal Gas Exchange  Intervention: Optimize Oxygenation and Ventilation    Problem: Risk for Delirium  Goal: Improved Behavioral Control  Intervention: Minimize Safety Risk

## 2024-02-04 LAB
ANION GAP SERPL CALCULATED.3IONS-SCNC: 7 MMOL/L (ref 7–15)
BUN SERPL-MCNC: 22.1 MG/DL (ref 8–23)
CALCIUM SERPL-MCNC: 9 MG/DL (ref 8.8–10.2)
CHLORIDE SERPL-SCNC: 98 MMOL/L (ref 98–107)
CREAT SERPL-MCNC: 0.69 MG/DL (ref 0.51–0.95)
DEPRECATED HCO3 PLAS-SCNC: 34 MMOL/L (ref 22–29)
EGFRCR SERPLBLD CKD-EPI 2021: 85 ML/MIN/1.73M2
GLUCOSE SERPL-MCNC: 141 MG/DL (ref 70–99)
MAGNESIUM SERPL-MCNC: 1.8 MG/DL (ref 1.7–2.3)
PLATELET # BLD AUTO: 131 10E3/UL (ref 150–450)
POTASSIUM SERPL-SCNC: 3.8 MMOL/L (ref 3.4–5.3)
SODIUM SERPL-SCNC: 139 MMOL/L (ref 135–145)

## 2024-02-04 PROCEDURE — 99232 SBSQ HOSP IP/OBS MODERATE 35: CPT | Mod: GC | Performed by: STUDENT IN AN ORGANIZED HEALTH CARE EDUCATION/TRAINING PROGRAM

## 2024-02-04 PROCEDURE — 99232 SBSQ HOSP IP/OBS MODERATE 35: CPT | Performed by: INTERNAL MEDICINE

## 2024-02-04 PROCEDURE — 210N000002 HC R&B HEART CARE

## 2024-02-04 PROCEDURE — 83735 ASSAY OF MAGNESIUM: CPT | Performed by: STUDENT IN AN ORGANIZED HEALTH CARE EDUCATION/TRAINING PROGRAM

## 2024-02-04 PROCEDURE — 250N000013 HC RX MED GY IP 250 OP 250 PS 637

## 2024-02-04 PROCEDURE — 85049 AUTOMATED PLATELET COUNT: CPT

## 2024-02-04 PROCEDURE — 80048 BASIC METABOLIC PNL TOTAL CA: CPT

## 2024-02-04 PROCEDURE — 36415 COLL VENOUS BLD VENIPUNCTURE: CPT

## 2024-02-04 PROCEDURE — 250N000011 HC RX IP 250 OP 636: Mod: JZ | Performed by: INTERNAL MEDICINE

## 2024-02-04 PROCEDURE — 250N000013 HC RX MED GY IP 250 OP 250 PS 637: Performed by: INTERNAL MEDICINE

## 2024-02-04 RX ORDER — DIGOXIN 0.25 MG/ML
250 INJECTION INTRAMUSCULAR; INTRAVENOUS EVERY 4 HOURS
Status: COMPLETED | OUTPATIENT
Start: 2024-02-04 | End: 2024-02-04

## 2024-02-04 RX ORDER — DIGOXIN 0.25 MG/ML
500 INJECTION INTRAMUSCULAR; INTRAVENOUS ONCE
Status: COMPLETED | OUTPATIENT
Start: 2024-02-04 | End: 2024-02-04

## 2024-02-04 RX ORDER — DIGOXIN 125 MCG
125 TABLET ORAL DAILY
Status: DISCONTINUED | OUTPATIENT
Start: 2024-02-05 | End: 2024-02-05 | Stop reason: HOSPADM

## 2024-02-04 RX ADMIN — METFORMIN ER 500 MG 500 MG: 500 TABLET ORAL at 09:21

## 2024-02-04 RX ADMIN — METFORMIN ER 500 MG 500 MG: 500 TABLET ORAL at 18:43

## 2024-02-04 RX ADMIN — DIGOXIN 250 MCG: 0.25 INJECTION INTRAMUSCULAR; INTRAVENOUS at 17:25

## 2024-02-04 RX ADMIN — APIXABAN 5 MG: 5 TABLET, FILM COATED ORAL at 20:05

## 2024-02-04 RX ADMIN — METOPROLOL TARTRATE 150 MG: 100 TABLET, FILM COATED ORAL at 20:05

## 2024-02-04 RX ADMIN — Medication 100 MCG: at 09:20

## 2024-02-04 RX ADMIN — DIGOXIN 500 MCG: 0.25 INJECTION INTRAMUSCULAR; INTRAVENOUS at 09:19

## 2024-02-04 RX ADMIN — APIXABAN 5 MG: 5 TABLET, FILM COATED ORAL at 09:21

## 2024-02-04 RX ADMIN — FUROSEMIDE 40 MG: 40 TABLET ORAL at 09:21

## 2024-02-04 RX ADMIN — DIGOXIN 250 MCG: 0.25 INJECTION INTRAMUSCULAR; INTRAVENOUS at 13:01

## 2024-02-04 RX ADMIN — ATORVASTATIN CALCIUM 40 MG: 40 TABLET, FILM COATED ORAL at 20:05

## 2024-02-04 RX ADMIN — METOPROLOL TARTRATE 150 MG: 100 TABLET, FILM COATED ORAL at 09:20

## 2024-02-04 ASSESSMENT — ACTIVITIES OF DAILY LIVING (ADL)
ADLS_ACUITY_SCORE: 24
ADLS_ACUITY_SCORE: 22
ADLS_ACUITY_SCORE: 22
ADLS_ACUITY_SCORE: 24
ADLS_ACUITY_SCORE: 24

## 2024-02-04 NOTE — PLAN OF CARE
Patient alert and oriented. Up in chair part of this shift. Shortness of breath at rest and with activity. Denies chest pain. On Tele A-Fib now CVR. IV digoxin x2 this shift with great rate control. VSS. LS diminished. Remained on 2L NC. Oxygen wean off trial not successful. Safety precautions in place. Plan NPO midnight and possible cardioversion tomorrow if remained elevated HR. Will monitor and continue pan of care.     Problem: Dysrhythmia  Goal: Normalized Cardiac Rhythm  Outcome: Progressing     Problem: Gas Exchange Impaired  Goal: Optimal Gas Exchange  Outcome: Progressing     Problem: Fall Injury Risk  Goal: Absence of Fall and Fall-Related Injury  Outcome: Progressing

## 2024-02-04 NOTE — PROGRESS NOTES
Care Management Follow Up    Length of Stay (days): 6    Expected Discharge Date: 02/05/2024     Concerns to be Addressed: discharge planning, care progression      Patient plan of care discussed at interdisciplinary rounds: Yes    Anticipated Discharge Disposition: Home     Anticipated Discharge Services: None  Anticipated Discharge DME:  (per treatment team)    Patient/family educated on Medicare website which has current facility and service quality ratings:  (not applicable)  Education Provided on the Discharge Plan: Yes  Patient/Family in Agreement with the Plan: yes    Referrals Placed by CM/SW:  none  Private pay costs discussed: Not applicable    Additional Information:  Chart reviewed and plan of care discussed with resident physician.  Cardiology is following for A-fib and adjusting medications for heart rate control  If heart rate remains elevated, pt will be NPO tonight for possible cardioversion tomorrow.  Pt is currently on Oxygen 2L NC.  Pt may need Home O2 eval prior to discharge.      Camila Stevens RN

## 2024-02-04 NOTE — PROGRESS NOTES
[facility] HEART CARE   [address], [place] [address]   [email or web address]   OFFICE: [phone]            Impression and Plan     1.  Atrial fibrillation.  Maritza presents with new diagnosis of atrial fibrillation of unknown duration.  Maritza has no awareness of the rhythm disturbance.  Ventricular response continues to improve though still with bouts of rapid ventricular response especially with ambulation despite aggressive upward titration of metoprolol.  Plan:  Continue metoprolol (increased further to ? 36   ago 150 mg twice daily).  Continue apixaban 5 mg twice daily.  Will initiate digoxin with initial IV load this morning.  Will administer 500 mcg IV now, 250 mcg in 4  from now, and 250 mcg in 8  from now.  Initiate enteral therapy 0.125 mg daily starting 5 February 2024  Will tentatively still plan on eventual direct-current cardioversion after 3-4 weeks of full anticoagulation.  If, however, Maritza still with elevated heart rate despite the aforementioned measures, will consider transesophageal directed cardioversion 5 February 2024 (will reassess in a.m 5 February).  Will keep n.p.o. after midnight.     2.  Hypervolemia.  Subjectively & objectively improved.  Renal function stable with creatinine 0.69 mg/dL this morning.    Continue enteral furosemide 40 mg daily    3.  Coronary artery disease.  Maritza has known coronary artery disease by virtue of CT scan this admission revealing severe coronary calcification.  ECG on presentation suggests possible prior anteroseptal infarct.  Relatively low high-sensitivity troponin values without ? change would suggest against any recent injury (24 ? 22 ng/L).  She has not had any chest pain symptoms.  Echocardiogram this admission revealed well-preserved left ventricular systolic performance without wall motion abnormality.     4.  Aortic stenosis.  This is felt moderate-severe on echocardiogram 30 January 2024 (see  "Cardiac Diagnostics section below).  Will arrange for follow-up in the Valve Clinic post dismissal from hospital.    5.  Hypertension.  Blood pressure most recently 103/62 mmHg.  Continue metoprolol as per problem #1.     6.  Dyslipidemia.  Lipid profile 31 January 2024 revealed LDL 32 mg/dL and HDL 39 mg/dL.  Continue statin therapy.    At time of dismissal from hospital, I have already made arrangements for Maritza to follow-up in the Atrial Fibrillation Clinic in approximately 2 weeks.  Will also make arrangements for follow-up in the Valve Clinic vis-à-vis aortic stenosis (both follow-up requests already submitted through Epic).     Primary Cardiologist: Dr. Cuate Rico (initial consultation this admission).    Subjective     Continues to deny palpitations despite atrial fibrillation.  No chest pain symptoms.  Breathing again improved from admission.     Cardiac Diagnostics   Telemetry (personally reviewed): Atrial fibrillation    Echocardiogram 3 January 2024:  Normal left ventricular size and systolic performance with ejection fraction of 60 to 65%.  Moderate-severe aortic stenosis.  Mean gradient is measured at 39 mmHg with peak velocity of 3.5 m/s.  Calculated valve area 0.9 cm .  Normal right ventricular size and systolic performance.  Severe left atrial enlargement.  Moderate right atrial enlargement.  Right ventricular systolic pressure relative to right atrial pressure is mildly increased.  The pulmonary artery pressures estimated to be 35-40 mmHg plus right atrial pressure.    Twelve-lead ECG (personally reviewed) 29 January 2024: Atrial fibrillation with heart rate of 175 bpm. Somewhat low voltage. Cannot rule out anteroseptal infarct.     Physical Examination       /62   Pulse 119   Temp 98.4  F (36.9  C) (Oral)   Resp 18   Ht 1.651 m (5' 5\")   Wt 96.9 kg (213 lb 11.2 oz)   SpO2 95%   BMI 35.56 kg/m          Vitals:    01/29/24 1439 01/30/24 1300 02/01/24 0305 02/02/24 0327 "   Weight: 99.8 kg (220 lb) 98.9 kg (218 lb 0.6 oz) 98.4 kg (216 lb 14.9 oz) 95.4 kg (210 lb 6.4 oz)    02/03/24 0638   Weight: 96.9 kg (213 lb 11.2 oz)            Intake/Output Summary (Last 24 hours) at 1/31/2024 0626  Last data filed at 1/30/2024 1958  Gross per 24 hour   Intake 766.75 ml   Output --   Net 766.75 ml       The patient is alert and oriented times three. Sclerae are anicteric. Mucosal membranes are moist.  No significant adenopathy/thyromegally appreciated. Lungs are clear.  On cardiovascular exam, the patient has irregular S1 and S2.  Systolic murmur without change.  Abdomen is soft and non-tender. Extremities reveal no clubbing, cyanosis.         Imaging      CT scan of chest 29 January 2024:  No pulmonary embolism.  Enlargement main pulmonary which can be seen with pulmonary hypertension.  Hypoattenuation of the left atrial appendage. While this could represent contrast mixing artifact, a echocardiogram is recommended to exclude thrombus.  Findings suggesting cardiogenic pulmonary edema with moderate right and small left pleural effusions.  Large thyroid goiter with intrathoracic extension. Recommend thyroid ultrasound for further characterization.  Cirrhotic liver morphology. Consider nonemergent right upper quadrant abdominal ultrasound for further characterization.  Coronary calcification: Severe.    Lab Results     Recent Labs   Lab 02/04/24  0608 02/03/24  0607 02/02/24  1125 02/02/24  0556 02/01/24  1704 02/01/24  0517 01/29/24  1739 01/29/24  1505 01/29/24  1504   WBC  --   --   --   --   --   --   --  7.8  --    HGB  --   --   --   --   --   --   --  11.7  --    MCV  --   --   --   --   --   --   --  96  --    *  --   --   --   --  121*  --  177  --    INR  --   --   --   --   --   --   --   --  1.22*    142  --  142  --  141   < > 141  --    POTASSIUM 3.8 3.6 4.1 3.2*  --  3.4   < > 4.2  --    CHLORIDE 98 99  --  97*  --  98   < > 103  --    CO2 34* 36*  --  37*  --  32*   <  "> 24  --    BUN 22.1 21.5  --  21.5  --  16.6   < > 13.0  --    CR 0.69 0.72  --  0.73  --  0.74   < > 0.76  --    ANIONGAP 7 7  --  8  --  11   < > 14  --    SHI 9.0 9.2  --  9.1  --  9.1   < > 9.8  --    * 137*  --  129*   < > 132*   < > 208*  --    ALBUMIN  --   --   --   --   --  3.6  --   --   --    PROTTOTAL  --   --   --   --   --  6.6  --   --   --    BILITOTAL  --   --   --   --   --  0.5  --   --   --    ALKPHOS  --   --   --   --   --  75  --   --   --    ALT  --   --   --   --   --  10  --   --   --    AST  --   --   --   --   --  22  --   --   --     < > = values in this interval not displayed.     No results for input(s): \"NTBNPI\", \"BNP\" in the last 61370 hours.    Invalid input(s): \"NTPNP\"  Recent Labs   Lab 01/31/24  0514   CHOL 100   TRIG 146   HDL 39*     Lab Results   Component Value Date     01/31/2024    CO2 30 01/31/2024    BUN 17.0 01/31/2024     Lab Results   Component Value Date    WBC 7.8 01/29/2024    HGB 11.7 01/29/2024    HCT 37.1 01/29/2024    MCV 96 01/29/2024     01/29/2024     Lab Results   Component Value Date    CHOL 152 02/03/2017    TRIG 296 02/03/2017    HDL 36 02/03/2017     Lab Results   Component Value Date    INR 1.22 01/29/2024     Lab Results   Component Value Date    TSH 1.00 01/29/2024           Current Inpatient Scheduled Medications   Scheduled Meds:   apixaban ANTICOAGULANT  5 mg Oral BID    atorvastatin  40 mg Oral QPM    cyanocobalamin  100 mcg Oral Daily    furosemide  40 mg Oral Daily    metFORMIN  500 mg Oral BID w/meals    metoprolol tartrate  150 mg Oral BID    sodium chloride (PF)  3 mL Intracatheter Q8H     Continuous Infusions:   dilTIAZem Stopped (01/30/24 8874)            Medications Prior to Admission   Prior to Admission medications    Medication Sig Start Date End Date Taking? Authorizing Provider   atorvastatin (LIPITOR) 40 MG tablet [ATORVASTATIN (LIPITOR) 40 MG TABLET] TAKE ONE TABLET BY MOUTH DAILY AT BEDTIME 8/3/17  Yes James, " Cristofer HARGROVE MD   cyanocobalamin (VITAMIN B-12) 100 MCG tablet Take 100 mcg by mouth daily   Yes Unknown, Entered By History   fluticasone (FLONASE) 50 MCG/ACT nasal spray Spray 1 spray into both nostrils 2 times daily   Yes Unknown, Entered By History   losartan (COZAAR) 50 MG tablet Take 50 mg by mouth every evening   Yes Unknown, Entered By History   metFORMIN (GLUCOPHAGE XR) 500 MG 24 hr tablet Take 500 mg by mouth 2 times daily (with meals)   Yes Unknown, Entered By History   multivitamin (CENTRUM SILVER) tablet Take 1 tablet by mouth daily   Yes Unknown, Entered By History   PROAIR HFA 90 mcg/actuation inhaler [PROAIR HFA 90 MCG/ACTUATION INHALER] INHALE TWO PUFFS BY MOUTH EVERY 4 TO 6 HOURS AS NEEDED - MAX OF 12 PUFFS IN 24 HOURS 12/23/14  Yes Cristofer Ramirez MD   Vitamin D3 (CHOLECALCIFEROL) 25 mcg (1000 units) tablet Take 25 mcg by mouth daily   Yes Unknown, Entered By History

## 2024-02-04 NOTE — PROGRESS NOTES
[facility]    Progress Note - Hospitalist Service       Date of Admission:  1/29/2024    Assessment & Plan   Maritza Thomas is a 83 year old female admitted on 1/29/2024. She has a history of HTN, T2DM, asthma and is admitted on 1/29/2024 for afib with RVR.     Acute hypoxic respiratory failure  A-fib with RVR  Bilateral pleural effusions  Pulmonary hypertension  Aortic stenosis  Patient presented to ED for new A-fib with RVR. CT PE showed moderate right and small left pleural effusions.  Echo showed EF 60-65% with left atrium severely dilated, moderate to severe valvular aortic stenosis and pulmonary hypertension.  Pulmonary hypertension could be linked to undiagnosed TONY.  Patient is requiring 3 LPM of oxygen, she is not on any oxygen at home.  Infectious workup done with a negative UA, negative chest x-ray, negative procalcitonin.  Continuing to titrate medication to control ventricular response.  Patient has still been requiring additional doses of IV metoprolol overnight.  -Cardiac telemetry  -Cardiology consulted, appreciate recommendations  -Continue metoprolol 150 mg BID  -add digoxin IV   -if no improvement after digoxin, NPO at midnight for AM cardioversion  -Apixaban 5 mg BID  -If able to optimize ventricular response, plan on eventual direct-current cardioversion after 3-4 weeks of full anticoagulation.  -Oral furosemide 40 mg daily  -Follow up with Valve clinic outpatient  -Fluid restriction 2L  -AM BMP  -Recommend outpatient sleep study  -Moderate consistent carb diet     Hypomagnesemia  Hypokalemia  -Magnesium and potassium replacement protocols    Asthma  -Holding PTA albuterol in setting of tachycardia, can reevaluate if increased SOB.  Considering adding levoalbuterol.      DMT2  Manages with metformin only.  Last A1c 6.9 on 6/2023.  Glucose mildly elevated on admission.  A1c was 7.3 on admission.  -Continue PTA metformin     Thyroid goiter  Cirrhotic  "liver  Incidental findings on chest CT.  Found to have large thyroid goiter with intrathoracic extension recommending thyroid US.  Also has cirrhotic liver morphology and to consider nonemergent RUQ US. Free T3, T4, thyroid peroxidase antibody all WNL. Thyroid ultrasound shows enlarged and heterogenous thyroid.  Recommend follow-up with PCP and possible referral to endocrinology.     HTN  -Hold PTA losartan in setting of normal BP and A-fib management as above         Diet: Fluid restriction 2000 ML FLUID  Moderate Consistent Carb (60 g CHO per Meal) Diet  NPO for Medical/Clinical Reasons Except for: Meds    DVT Prophylaxis: DOAC  Boswell Catheter: Not present  Fluids: P.o.  Lines: None     Cardiac Monitoring: ACTIVE order. Indication: Tachyarrhythmias, acute (48 hours)  Code Status: Full Code      Clinically Significant Risk Factors            # Hypomagnesemia: Lowest Mg = 1.4 mg/dL in last 2 days, will replace as needed       # Hypertension: Noted on problem list       # DMII: A1C = 7.3 % (Ref range: <5.7 %) within past 6 months   # Obesity: Estimated body mass index is 35.06 kg/m  as calculated from the following:    Height as of this encounter: 1.651 m (5' 5\").    Weight as of this encounter: 95.6 kg (210 lb 11.2 oz).      # Financial/Environmental Concerns: none  # Asthma: noted on problem list        Disposition Plan      Expected Discharge Date: 02/05/2024      Destination: home  Discharge Comments: Cards adjusting meds for HR control.  Needs home O2 eval prior to d/c.        The patient's care was discussed with the Attending Physician, Dr. Hurley .    Katie Carpenter MD PGY3  Hospitalist Service  [facility]  Securely message with [employer] (more info)  Text page via [facility] Paging/Directory   ______________________________________________________________________    Interval History   NAEO.  Patient feeling well overall. She denies vomiting, nausea, abdominal pain, lightheadedness. She " denies feelings of heart palpitation.     Physical Exam   Vital Signs: Temp: 97.8  F (36.6  C) Temp src: Oral BP: 130/63 Pulse: 95   Resp: 18 SpO2: 90 % O2 Device: Nasal cannula Oxygen Delivery: 2 LPM  Weight: 210 lbs 11.2 oz    GENERAL: alert and no acute distress  RESP: No crackles, wheezing or rhonchi, nasal cannula  CV: Irregularly irregular rhythm and tachycardic, systolic murmur appreciated  ABDOMEN: soft, nontender, and bowel sounds normal  MS: no gross musculoskeletal defects noted, lower extremity edema improved  PSYCH: mentation appears normal, affect normal/bright    Data     I have personally reviewed the following data over the past 24 hrs:    N/A  \   N/A   / 131 (L)     139 98 22.1 /  141 (H)   3.8 34 (H) 0.69 \       Imaging results reviewed over the past 24 hrs:   No results found for this or any previous visit (from the past 24 hour(s)).

## 2024-02-04 NOTE — PLAN OF CARE
Goal Outcome Evaluation:      Problem: Adult Inpatient Plan of Care  Goal: Optimal Comfort and Wellbeing  Outcome: Progressing     Problem: Gas Exchange Impaired  Goal: Optimal Gas Exchange  Outcome: Progressing     Problem: Dysrhythmia  Goal: Normalized Cardiac Rhythm  Outcome: Progressing     Patient is up with a standby assist. Tolerating activity fairly well at this time. Reports improvement in shortness of breath at this time. Voiding. Attempted to wean oxygen down to 1L via nasal cannula, but patient's oxygen dropped below 90% so had to increase back up to 2L. Patient's HR started to increase this evening and was consistently around 120. Cardiology paged, and cardiologist ordered 1x dose of 25mg of lopressor and 125mg of lopressor at bedtime instead of the patient's normal 150mg that she would be getting. Cardiologist recommended having the patient get lopressor TID instead of BID. Patient asymptomatic. Call light placed within reach and purposeful rounding performed. Alannah Jeong RN

## 2024-02-04 NOTE — CARE PLAN
"HR running  overnight, does get as high as 180 for short periods while up. Does appear slightly SOB after ambulation to the BR, but states she feels \"fine.\" Requiring 2L via NC overnight to keep sats above 90%. Lungs dim, dry cough. Denies palpitations, dizziness, nausea or pain.     Goal Outcome Evaluation:  Problem: Risk for Delirium  Goal: Improved Behavioral Control  Intervention: Minimize Safety Risk     Problem: Gas Exchange Impaired  Goal: Optimal Gas Exchange  Intervention: Optimize Oxygenation and Ventilation    Problem: Dysrhythmia  Goal: Normalized Cardiac Rhythm  Outcome: Progressing  "

## 2024-02-05 VITALS
HEART RATE: 89 BPM | WEIGHT: 213.41 LBS | RESPIRATION RATE: 18 BRPM | SYSTOLIC BLOOD PRESSURE: 130 MMHG | OXYGEN SATURATION: 93 % | TEMPERATURE: 98.4 F | HEIGHT: 65 IN | BODY MASS INDEX: 35.56 KG/M2 | DIASTOLIC BLOOD PRESSURE: 74 MMHG

## 2024-02-05 LAB
ANION GAP SERPL CALCULATED.3IONS-SCNC: 11 MMOL/L (ref 7–15)
BUN SERPL-MCNC: 19.7 MG/DL (ref 8–23)
CALCIUM SERPL-MCNC: 9 MG/DL (ref 8.8–10.2)
CHLORIDE SERPL-SCNC: 98 MMOL/L (ref 98–107)
CREAT SERPL-MCNC: 0.69 MG/DL (ref 0.51–0.95)
DEPRECATED HCO3 PLAS-SCNC: 32 MMOL/L (ref 22–29)
EGFRCR SERPLBLD CKD-EPI 2021: 85 ML/MIN/1.73M2
GLUCOSE SERPL-MCNC: 131 MG/DL (ref 70–99)
MAGNESIUM SERPL-MCNC: 1.7 MG/DL (ref 1.7–2.3)
POTASSIUM SERPL-SCNC: 4.1 MMOL/L (ref 3.4–5.3)
SODIUM SERPL-SCNC: 141 MMOL/L (ref 135–145)

## 2024-02-05 PROCEDURE — 80048 BASIC METABOLIC PNL TOTAL CA: CPT

## 2024-02-05 PROCEDURE — 250N000013 HC RX MED GY IP 250 OP 250 PS 637

## 2024-02-05 PROCEDURE — 83735 ASSAY OF MAGNESIUM: CPT | Performed by: STUDENT IN AN ORGANIZED HEALTH CARE EDUCATION/TRAINING PROGRAM

## 2024-02-05 PROCEDURE — 99233 SBSQ HOSP IP/OBS HIGH 50: CPT | Performed by: INTERNAL MEDICINE

## 2024-02-05 PROCEDURE — 99238 HOSP IP/OBS DSCHRG MGMT 30/<: CPT | Mod: GC

## 2024-02-05 PROCEDURE — 250N000013 HC RX MED GY IP 250 OP 250 PS 637: Performed by: INTERNAL MEDICINE

## 2024-02-05 PROCEDURE — 36415 COLL VENOUS BLD VENIPUNCTURE: CPT

## 2024-02-05 RX ORDER — DIGOXIN 125 MCG
125 TABLET ORAL DAILY
Qty: 30 TABLET | Refills: 0 | Status: SHIPPED | OUTPATIENT
Start: 2024-02-06

## 2024-02-05 RX ORDER — METOPROLOL TARTRATE 75 MG/1
150 TABLET, FILM COATED ORAL 2 TIMES DAILY
Qty: 60 TABLET | Refills: 0 | Status: SHIPPED | OUTPATIENT
Start: 2024-02-05

## 2024-02-05 RX ADMIN — FUROSEMIDE 40 MG: 40 TABLET ORAL at 09:05

## 2024-02-05 RX ADMIN — DIGOXIN 125 MCG: 125 TABLET ORAL at 09:05

## 2024-02-05 RX ADMIN — APIXABAN 5 MG: 5 TABLET, FILM COATED ORAL at 09:05

## 2024-02-05 RX ADMIN — METOPROLOL TARTRATE 150 MG: 100 TABLET, FILM COATED ORAL at 09:04

## 2024-02-05 RX ADMIN — Medication 100 MCG: at 09:05

## 2024-02-05 RX ADMIN — METFORMIN ER 500 MG 500 MG: 500 TABLET ORAL at 09:05

## 2024-02-05 ASSESSMENT — ACTIVITIES OF DAILY LIVING (ADL)
ADLS_ACUITY_SCORE: 22
ADLS_ACUITY_SCORE: 25
ADLS_ACUITY_SCORE: 22
ADLS_ACUITY_SCORE: 22

## 2024-02-05 NOTE — PROGRESS NOTES
Care Management Discharge Note    Discharge Date: 02/05/2024       Discharge Disposition: Home    Discharge Services: None    Discharge DME: None    Discharge Transportation: family or friend will provide    Education Provided on the Discharge Plan: Yes    Persons Notified of Discharge Plans: patient    Patient/Family in Agreement with the Plan: yes        Additional Information:  CM reviewed chart. No CM needs identified or requested. Family to provide transportation home at discharge.     Deloris Marcum RN

## 2024-02-05 NOTE — PLAN OF CARE
VSS on 2L O2 NC, unable to wean. Denies pain. A fib on tele, HR 90s at rest & 110s when ambulating. IV dig did not seem to have a dramatic impact on HR. LS dim. Remained in range of fluid restriction. Ambulated outside of room x1, SANTANA. Appears to be resting comfortably.    [id number]

## 2024-02-05 NOTE — PLAN OF CARE
Problem: Adult Inpatient Plan of Care  Goal: Optimal Comfort and Wellbeing  Outcome: Progressing   Goal Outcome Evaluation:         Pt. Will rest tonight with minimal rest and sleep interruptions.

## 2024-02-05 NOTE — PROGRESS NOTES
"  HEART CARE CONSULTATON NOTE        Assessment/Recommendations   Assessment:  1.  Atrial fibrillation: Unknown duration and currently now well rate controlled on digoxin and metoprolol  2.  Anticoagulation: [id number] score of 6 (age, gender, CAD, CHF, diabetes, hypertension) appropriately started on Eliquis 5 mg twice daily  3.  Acute on chronic heart failure with preserved ejection fraction: Improved with diuresis  4.  Coronary artery disease: Severe coronary calcification noted on CT without anginal complaints  5.  Aortic stenosis: Moderate to severe and will need follow-up in our valve clinic    Plan:  1.  Continue on digoxin and metoprolol for rate control  2.  Continue on Eliquis 5 mg twice daily for anticoagulation.  Will need follow-up in A-fib clinic and may proceed with cardioversion in few weeks as outpatient  3.  Follow-up in valve clinic for moderate to severe aortic stenosis    Clinically Significant Risk Factors                    # Hypertension: Noted on problem list         # DMII: A1C = 7.3 % (Ref range: <5.7 %) within past 6 months   # Obesity: Estimated body mass index is 35.51 kg/m  as calculated from the following:    Height as of this encounter: 1.651 m (5' 5\").    Weight as of this encounter: 96.8 kg (213 lb 6.5 oz).      # Financial/Environmental Concerns: none  # Asthma: noted on problem list       Cardiac Arrhythmia: Atrial fibrillation: Persistent  Diastolic acute      Primary cardiologist: Dr. Rico     History of Present Illness/Subjective    telemetry: Rate controlled atrial fibrillation running  bpm  No palpitations, chest pain or shortness of breath     Physical Examination  Review of Systems   VITALS: /70 (BP Location: Left arm)   Pulse 87   Temp 98.6  F (37  C) (Oral)   Resp 18   Ht 1.651 m (5' 5\")   Wt 96.8 kg (213 lb 6.5 oz)   SpO2 93%   BMI 35.51 kg/m    BMI: Body mass index is 35.51 kg/m .  Wt Readings from Last 3 Encounters:   02/05/24 96.8 kg (213 lb " 6.5 oz)   02/03/17 108.9 kg (240 lb)   08/09/16 105.2 kg (232 lb)       Intake/Output Summary (Last 24 hours) at 2/5/2024 1016  Last data filed at 2/5/2024 0908  Gross per 24 hour   Intake 1615 ml   Output 2250 ml   Net -635 ml     General Appearance:   no distress, normal body habitus   ENT/Mouth: membranes moist, no oral lesions or bleeding gums.      EYES:  no scleral icterus, normal conjunctivae   Neck: no carotid bruits or thyromegaly   Chest/Lungs:   lungs are clear to auscultation   Cardiovascular:   irregular. Normal first and second heart sounds with no murmur no edema bilaterally    Abdomen:  no organomegaly, masses, bruits, or tenderness; bowel sounds are present   Extremities: no cyanosis or clubbing   Skin: no xanthelasma, warm.    Neurologic: normal  bilateral, no tremors     Psychiatric: alert and oriented x3, calm     Review Of Systems  Skin: negative  Eyes: negative  Ears/Nose/Throat: negative  Respiratory: No shortness of breath, dyspnea on exertion, cough, or hemoptysis  Cardiovascular: negative    Gastrointestinal: negative  Genitourinary: negative  Musculoskeletal: negative  Neurologic: negative  Psychiatric: negative  Hematologic/Lymphatic/Immunologic: negative  Endocrine: negative          Lab Results    Chemistry/lipid CBC Cardiac Enzymes/BNP/TSH/INR   Recent Labs   Lab Test 01/31/24  0514   CHOL 100   HDL 39*   LDL 32   TRIG 146     Recent Labs   Lab Test 01/31/24  0514 02/03/17  1329 03/29/16  1130   LDL 32 57 76     Recent Labs   Lab Test 02/05/24  0610      POTASSIUM 4.1   CHLORIDE 98   CO2 32*   *   BUN 19.7   CR 0.69   GFRESTIMATED 85   SHI 9.0     Recent Labs   Lab Test 02/05/24  0610 02/04/24  0608 02/03/24  0607   CR 0.69 0.69 0.72     Recent Labs   Lab Test 01/29/24  1505 02/03/17  1329 08/09/16  1153   A1C 7.3* 6.9* 6.1*          Recent Labs   Lab Test 02/04/24  0608 02/01/24  0517 01/29/24  1505   WBC  --   --  7.8   HGB  --   --  11.7   HCT  --   --  37.1   MCV   "--   --  96   *   < > 177    < > = values in this interval not displayed.     Recent Labs   Lab Test 01/29/24  1505   HGB 11.7    No results for input(s): \"TROPONINI\" in the last 25539 hours.  Recent Labs   Lab Test 01/29/24  1505   NTBNP 1,076     Recent Labs   Lab Test 01/29/24  1505   TSH 1.00     Recent Labs   Lab Test 01/29/24  1504   INR 1.22*        Medical History  Surgical History Family History Social History   Coronary artery disease  hypertension Past Surgical History:   Procedure Laterality Date    APPENDECTOMY       No family history of heart disease     Social History     Socioeconomic History    Marital status:      Spouse name: Not on file    Number of children: Not on file    Years of education: Not on file    Highest education level: Not on file   Occupational History    Not on file   Tobacco Use    Smoking status: Former    Smokeless tobacco: Never   Substance and Sexual Activity    Alcohol use: Yes     Alcohol/week: 0.8 standard drinks of alcohol    Drug use: No    Sexual activity: Not on file   Other Topics Concern    Not on file   Social History Narrative    Not on file     Social Determinants of Health     Financial Resource Strain: Not on file   Food Insecurity: Not on file   Transportation Needs: Not on file   Physical Activity: Not on file   Stress: Not on file   Social Connections: Not on file   Interpersonal Safety: Not on file   Housing Stability: Not on file         Medications  Allergies   No current outpatient medications on file.      No Known Allergies      Sindi Mckenzie MD    "

## 2024-02-05 NOTE — PLAN OF CARE
Pt discharged home via car at 1600 w/ grandson. AVS & questions addressed, pt voiced no concerns. Belongings accounted for & sent.

## 2024-02-05 NOTE — PROGRESS NOTES
Still A-fib on the monitor, but HR is more controlled today. 80's-90's and low 100's with activity. Denies having pain. Was able to wean off of O2.    Assessment:  	  Patient is a 47y old  Female who presents with a chief complaint of Chest pain, and left arm numbness (30 Mar 2018 23:44). Is admitted to the telemetry floor for 1. r/o ACS  2. r/o ischemic stroke.      Problem/Plan - 1:  ·  Problem: Chest pain, unspecified type.  Plan: -r/o ACS given risk factors(smoking, HLD).  -aspirin and statin   -  -check TTE, would need to obtain record from PMD about stress test and previous echo result  -Telemetry monitoring and trend cardiac enzymes        Problem/Plan - 2:  ·  Problem: Left arm numbness.  Plan: -r/o CVA, given risk factors(smoking, HLD). Will check TTE and MR head  -CT head negative  -Aspirin and statin started.      Problem/Plan - 3:  ·  Problem: Smoker.  Plan: -Smoking cessation education was given and patient agreed with nicotine patch.      Problem/Plan - 4:  ·  Problem: rule out cervical myelopathy with brisk reflexes ,UNABLE TO DO MRI AT THIS TIME AS PATIENT HAD RIGHT FOOT SURGERY WITH METAL PLATE   DISCUSSED WITH THE PATIENT AS OUT PATIENT AND AFTER REVIEWING MEDICAL RECORDS WILL DO MRI AS OUT PATIENT IF CLEARED BY SURGEON     WILL DO CTA OF THE BRAIN AND NECK rule out carotid disection      DISCUSSED WITH THE RESIDENT AND DR VALERA

## 2024-02-05 NOTE — PROGRESS NOTES
CLINICAL NUTRITION SERVICES    Reviewed nutrition risk factors due to LOS. Pt is tolerating diet, eating well per nursing documentation. No nutrition issues identified at this time. RD will follow via further LOS unless consulted.

## 2024-02-05 NOTE — CONSULTS
SPIRITUAL HEALTH SERVICES ([facility])  SPIRITUAL ASSESSMENT Progress Note  [facility]. [address]     REFERRAL SOURCE: Consult (Routine)     Met with Maritza. She had just received communion and was not open to a longer spiritual care visit. She does not have a [employer] community to which she belongs nor a [place]  that she would like contacted. She does, however, appreciate receiving communion and the sacraments.      PLAN: [facility] to Maritza and I will refer her to the staff  for anointing.  [facility] does not have any further plans to follow at this time.      Antoinette Sol, Ph.D., [facility]      SHS available 24/7 for emergency requests/referrals, either by having the on-call  paged or by entering an ASAP/STAT consult in Epic (this will also page the on-call ).

## 2024-02-06 ENCOUNTER — TELEPHONE (OUTPATIENT)
Dept: PULMONOLOGY | Facility: CLINIC | Age: 84
End: 2024-02-06
Payer: COMMERCIAL

## 2024-02-06 ENCOUNTER — PATIENT OUTREACH (OUTPATIENT)
Dept: CARE COORDINATION | Facility: CLINIC | Age: 84
End: 2024-02-06
Payer: COMMERCIAL

## 2024-02-06 NOTE — TELEPHONE ENCOUNTER
Left Voicemail (1st Attempt) for the patient to call back and schedule the following:    Appointment type: Npulm  Provider: None  Return date: Next Avail  Specialty phone number: [phone]  Additional appointment(s) needed: full pft  Additonal Notes: NA

## 2024-02-06 NOTE — PROGRESS NOTES
[facility]: [facility]: Post-Discharge Note  SITUATION                                                      Admission:    Admission Date: 01/29/24   Reason for Admission: Hospitalized with atrial fibrillation with rapid ventricular response. Was found to have slight increase in fluid and  was given diuretics.  Discharge:   Discharge Date: 02/05/24  Discharge Diagnosis: Hospitalized with atrial fibrillation with rapid ventricular response. Was found to have slight increase in fluid and  was given diuretics.    BACKGROUND                                                      Per hospital discharge summary and inpatient provider notes:    Maritza Thomas is a 83 year old female who has a history of HTN, T2DM, asthma and is presenting with SOB and found to have afib with RVR.      Patient reports presenting for routine medical visit for diabetes today and reported shortness of breath at that time.  Found to be satting 82% on RA.  EKG done and found to be in A-fib with RVR.  Recommended presenting to the ED.     Patient reports shortness of breath is improved since arriving to the ED.  Shortness of breath is worse when lying down.  Has a mild cough, reports this has been present for the last 3 weeks with some worsening, was initially dry and now mildly productive.  Initially was using her inhaler which helped at first though was not helping today.  Also noticing swelling in bilateral legs which is new for her.  Reports no fevers, no chest pain, no palpitations, no nausea/vomiting, no diarrhea.  No pain with urination.  No significant changes to medications.  Patient does report being told that she has an irregular rhythm and she said that she knew about this for many years though has never seen a cardiologist or managed it in other ways.     Reports she lives at home alone, reports no alcohol, no smoking, no other drug use.  Manages medications by herself, is independent.  Does use a  cane.    ASSESSMENT           Discharge Assessment  How are you doing now that you are home?: I am doing good.  How are your symptoms? (Red Flag symptoms escalate to triage hotline per guidelines): Improved  Do you feel your condition is stable enough to be safe at home until your provider visit?: Yes  Does the patient have their discharge instructions? : Yes  Does the patient have questions regarding their discharge instructions? : No  Were you started on any new medications or were there changes to any of your previous medications? : Yes  Does the patient have all of their medications?: Yes  Do you have questions regarding any of your medications? : No  Discharge follow-up appointment scheduled within 14 calendar days? : No  Is patient agreeable to assistance with scheduling? : No                  PLAN                                                      Outpatient Plan:  Follow up with primary care provider, Rufino Mcphersonbury Trevin, within 7 days to evaluate medication change and for hospital  follow- up. Follow up with A-fib clinic and valve clinic with cardiology.    No future appointments.      For any urgent concerns, please contact our 24 hour nurse triage line: [phone] ([phone])         OLIVIA Heller  [phone]  [facility]  [facility]                  Mother

## 2024-02-07 NOTE — DISCHARGE SUMMARY
"[facility]  Discharge Summary - Medicine & Pediatrics       Date of Admission:  1/29/2024  Date of Discharge:  2/5/2024  4:38 PM  Discharging Provider: Dr. Ramires and Dr. Srinivasan  Discharge Service: Hospitalist Service    Discharge Diagnoses   Acute hypoxic respiratory failure secondary to bilateral pleural effusion  Atrial fibrillation with rapid ventricular response  Pulmonary Hypertension     Clinically Significant Risk Factors     # DMII: A1C = 7.3 % (Ref range: <5.7 %) within past 6 months  # Obesity: Estimated body mass index is 35.51 kg/m  as calculated from the following:    Height as of this encounter: 1.651 m (5' 5\").    Weight as of this encounter: 96.8 kg (213 lb 6.5 oz).       Follow-ups Needed After Discharge   Follow-up Appointments     Follow-up and recommended labs and tests       Follow up with primary care provider, Rufino Mcphersonbury Trevin, within 7   days to evaluate medication change and for hospital follow- up.  Follow up   with A-fib clinic and valve clinic with cardiology.            Unresulted Labs Ordered in the Past 30 Days of this Admission       No orders found from 12/30/2023 to 1/30/2024.          Discharge Disposition   Discharged to home  Condition at discharge: Stable    Hospital Course   Maritza Thomas is a 83 year old female admitted on 1/29/2024. She has a history of HTN, T2DM, asthma and is admitted on 1/29/2024 for afib with RVR.     Patient presented to ED with new A-fib with RVR and subsequent bilateral pleural effusions. Patient had an echo showing 60-65% EF w/ atrial dilation, aortic stenosis and pulmonary hypertension. Rate and rhythm control obtained and placed on full anticoagulation for 3-4 weeks prior to ablation. Patient also discharged on oral furosemide for further diuresis. Maritza found to have cirrhotic liver and thyroid goiter as incidental findings on CT.     Patient to follow up with cardiology and PCP       Consultations This " "Hospital Stay   CARDIOLOGY IP CONSULT  PHARMACY TEST CLAIM IP CONSULT  PHYSICAL THERAPY ADULT IP CONSULT  CARE MANAGEMENT / SOCIAL WORK IP CONSULT  PULMONARY IP CONSULT  SPIRITUAL HEALTH SERVICES IP CONSULT    Code Status   Prior       The patient was discussed with Dr. Craig SHRESTHA MD  [facility] HEART CARE  [address] [address]  Phone: [phone]  Fax: [phone]  ______________________________________________________________________    Physical Exam   Vital Signs:                    Weight: 213 lbs 6.48 oz  /74 (BP Location: Left arm)   Pulse 89   Temp 98.4  F (36.9  C) (Oral)   Resp 18   Ht 1.651 m (5' 5\")   Wt 96.8 kg (213 lb 6.5 oz)   SpO2 93%   BMI 35.51 kg/m    PHYSICAL EXAM:  GENERAL: Awake, alert, sitting upright in chair. No acute distress.   HEENT: No scleral icterus or conjunctival injection. Oral cavity moist and pink with no ulcers, exudate, or thrush present. No cervical or supraclavicular lymphadenopathy.  SKIN: Warm and dry. No bruises, rashes, or skin lesions.  LUNGS: Normal work of breathing with no use of accessory muscles. Clear breath sounds in all lung fields bilaterally with no wheezes or crackles appreciated.  CARDIAC: irregularly irregular rhythm. Normal S1 and S2. No murmurs, clicks, or rubs appreciated. No JVD. No peripheral edema.  ABDOMEN: Non-distended. Soft and non-tender throughout with no masses or organomegaly.  NEUROLOGIC: Alert and oriented. Sensation to light touch involving upper and lower extremities intact bilaterally.   EXTREMITIES: No gross deformity or peripheral edema. Appear well-perfused.         Primary Care Physician   Rufino Wheeler [facility]    Discharge Orders      Follow-Up with Cardiology ANITA      Follow-Up with Cardiology      Adult Sleep Eval & Management  Referral      Adult Pulmonary Medicine  Referral      Reason for your hospital stay    Hospitalized with " atrial fibrillation with rapid ventricular response. Was found to have slight increase in fluid and was given diuretics. Recommend following up with cardiology     Follow-up and recommended labs and tests     Follow up with primary care provider, Rufino Mcphersonbury Trevin, within 7 days to evaluate medication change and for hospital follow- up.  Follow up with A-fib clinic and valve clinic with cardiology.     Activity    Your activity upon discharge: activity as tolerated     Diet    Follow this diet upon discharge: Orders Placed This Encounter      Fluid restriction 2000 ML FLUID      2 Gram Sodium Diet       Significant Results and Procedures   Most Recent 3 CBC's:  Recent Labs   Lab Test 02/04/24  0608 02/01/24  0517 01/29/24  1505   WBC  --   --  7.8   HGB  --   --  11.7   MCV  --   --  96   * 121* 177     Most Recent 3 BMP's:  Recent Labs   Lab Test 02/05/24  0610 02/04/24  0608 02/03/24  0607    139 142   POTASSIUM 4.1 3.8 3.6   CHLORIDE 98 98 99   CO2 32* 34* 36*   BUN 19.7 22.1 21.5   CR 0.69 0.69 0.72   ANIONGAP 11 7 7   SHI 9.0 9.0 9.2   * 141* 137*     Results for orders placed or performed during the hospital encounter of 01/29/24   CT Chest Pulmonary Embolism w Contrast    Narrative    EXAM: CT CHEST PULMONARY EMBOLISM W CONTRAST  LOCATION: [facility]  DATE: 1/29/2024    INDICATION: sob hypoxic sub inr  COMPARISON: None.  TECHNIQUE: CT chest pulmonary angiogram during arterial phase injection of IV contrast. Multiplanar reformats and MIP reconstructions were performed. Dose reduction techniques were used.   CONTRAST: 90 mL Isovue 370    FINDINGS:  ANGIOGRAM CHEST: No pulmonary embolism. Enlargement of the main pulmonary artery which can be seen with pulmonary hypertension. Thoracic aorta is negative for dissection. No CT evidence of right heart strain.    LUNGS AND PLEURA: Diffuse smooth intralobular septal thickening with centrilobular groundglass  attenuation. Moderate right and small left pleural effusions with associated compressive atelectasis. Calcified pulmonary granulomas.    MEDIASTINUM/AXILLAE: Large thyroid goiter with leftward deviation of the trachea and intrathoracic extension. Aortic valve and mitral annular calcification. Hypoattenuation of the left atrial appendage.    CORONARY ARTERY CALCIFICATION: Severe.    UPPER ABDOMEN: Nodular hepatic contour suggesting underlying cirrhosis. Benign calcified splenic granulomas.    MUSCULOSKELETAL: Multilevel hypertrophic and degenerative changes in the thoracic spine.      Impression    IMPRESSION:  1.  No pulmonary embolism.  2.  Enlargement main pulmonary which can be seen with pulmonary hypertension.  3.  Hypoattenuation of the left atrial appendage. While this could represent contrast mixing artifact, a echocardiogram is recommended to exclude thrombus.  4.  Findings suggesting cardiogenic pulmonary edema with moderate right and small left pleural effusions.  5.  Large thyroid goiter with intrathoracic extension. Recommend thyroid ultrasound for further characterization.  6.  Cirrhotic liver morphology. Consider nonemergent right upper quadrant abdominal ultrasound for further characterization.   US Thyroid    Narrative    EXAM: US THYROID  LOCATION: [facility]  DATE: 1/30/2024    INDICATION: New A fib, goiter  COMPARISON: CT chest 1/29/2024  TECHNIQUE: Thyroid ultrasound.     FINDINGS:  Evaluation is limited by the size of the thyroid gland, patient body habitus, neck size, and poor sonographic penetration of the entire thyroid.    RIGHT lobe: 8.6 x 4.9 x 4.4 cm. Heterogenous echotexture.  Isthmus: 5 mm.  LEFT lobe: 3.8 x 2.0 x 2.5 cm. Heterogenous echotexture.    NECK: No cervical lymphadenopathy.    NODULES:  The inferior aspect of the right thyroid gland extends into the retrosternal region where it is suboptimally visualized. This is difficult to distinguish from an  exophytic nodule versus retrosternal thyroid parenchyma.      Impression    IMPRESSION:  Technically difficult examination with very limited sonographic visualization of the retrosternal portion of the thyroid gland. The entire gland is enlarged and heterogeneous, therefore difficult to distinguish an exophytic nodule from retrosternal   parenchyma.    Nodules are characterized per  ACR Thyroid Imaging, Reporting and Data System (TI-RADS): White Paper of the ACR TI-RADS Committee  Damaso Powers et al. Journal of the American College of Radiology 2017. Volume 14 (2017), Issue 5, 587-339.      XR Chest Port 1 View    Narrative    EXAM: XR CHEST PORT 1 VIEW  LOCATION: [facility]  DATE: 2024    INDICATION: Shortness of breath, hypoxic, AFIB w RVR, crackles on exam  COMPARISON: CT 2024      Impression    IMPRESSION: Small bilateral pleural effusions with adjacent bibasilar opacities which likely reflect atelectasis. Cardiomegaly with mild central vascular congestion and interstitial edema. These findings are similar to the comparison CT chest exam and   likely reflect CHF.   Echocardiogram Complete     Value    LVEF  60-65%    Narrative    [id number]  [id number]  [id number]  [id number]^MARY^[place] [address]     Name: MOUNA DAVEY  MRN: [id number]  : 1940  Study Date: 2024 08:21 AM  Age: 83 yrs  Gender: Female  Patient Location: [facility]  Reason For Study: Atrial Fibrillation  Ordering Physician: LIZZETTE HERNANDEZ  Performed By: ACE     BSA: 2.1 m2  Height: 65 in  Weight: 220 lb  HR: 96  BP: 128/74 mmHg  ______________________________________________________________________________  Procedure  Complete Portable Echo Adult. Definity (NDC #03001-918) given intravenously.  Technically difficult study. There is no comparison study  available.  ______________________________________________________________________________  Interpretation Summary     The left ventricle is normal in size with normal left ventricular wall  thickness. Left ventricular function is normal.The ejection fraction is 60-  65%.  Normal right ventricle size and systolic function.  The left atrium is severely dilated.  There is mild mitral stenosis. The mitral valve area is 1.3cm2.  Moderate to severe valvular aortic stenosis with peak velocity of 3.5 m/s,  mean gradient of 39 mmHg, and SINCERE of 0.9 cm2 at SVI of 31  Mild (35-45mmHg) pulmonary hypertension is present.  There is no comparison study available.  ______________________________________________________________________________  Left Ventricle  The left ventricle is normal in size. Left ventricular function is normal.The  ejection fraction is 60-65%. There is normal left ventricular wall thickness.  Diastolic function not assessed due to atrial fibrillation. No regional wall  motion abnormalities noted.     Right Ventricle  Normal right ventricle size and systolic function.     Atria  The left atrium is severely dilated. The right atrium is moderately dilated.  There is no color Doppler evidence of an atrial shunt.     Mitral Valve  There is moderate mitral annular calcification. There is mild mitral stenosis.  The mean mitral valve gradient is 5.8 mmHg. The mitral valve area is 1.3cm2.     Tricuspid Valve  Tricuspid valve leaflets appear normal. There is no evidence of tricuspid  stenosis or clinically significant tricuspid regurgitation. The right  ventricular systolic pressure is approximated at 36.7 mmHg plus the right  atrial pressure. Mild (35-45mmHg) pulmonary hypertension is present.     Aortic Valve  Moderate aortic valve calcification is present. No aortic regurgitation is  present. Moderate to severe valvular aortic stenosis.     Pulmonic Valve  The pulmonic valve is not well seen, but is grossly normal.  This degree of  valvular regurgitation is within normal limits. There is trace pulmonic  valvular regurgitation.     Vessels  The aorta root is normal. Normal size ascending aorta. IVC diameter <2.1 cm  collapsing >50% with sniff suggests a normal RA pressure of 3 mmHg.     Pericardium  There is no pericardial effusion.     Rhythm  The rhythm was atrial fibrillation.  ______________________________________________________________________________  MMode/2D Measurements & Calculations     IVSd: 1.0 cm  LVIDd: 4.5 cm  LVIDs: 3.0 cm  LVPWd: 1.1 cm  FS: 34.4 %  LV mass(C)d: 170.1 grams  LV mass(C)dI: 82.6 grams/m2  Ao root diam: 3.5 cm  LA dimension: 5.4 cm  asc Aorta Diam: 3.6 cm  LA/Ao: 1.5  LVOT diam: 2.0 cm  LVOT area: 3.0 cm2  Ao root diam index Ht(cm/m): 2.1  Ao root diam index BSA (cm/m2): 1.7  Asc Ao diam index BSA (cm/m2): 1.8  Asc Ao diam index Ht(cm/m): 2.2  LA Volume (BP): 93.7 ml     LA Volume Index (BP): 45.5 ml/m2  LA Volume Indexed (AL/bp): 49.4 ml/m2  RV Base: 4.0 cm  RWT: 0.49  TAPSE: 0.74 cm     Time Measurements  MM HR: 96.0 BPM     Doppler Measurements & Calculations  MV E max dru: 196.0 cm/sec  MV max P.0 mmHg  MV mean P.8 mmHg  MV V2 VTI: 50.6 cm  MVA(VTI): 1.3 cm2  MV dec slope: 792.0 cm/sec2  MV dec time: 0.25 sec  Ao V2 max: 349.7 cm/sec  Ao max P.0 mmHg  Ao V2 mean: 292.1 cm/sec  Ao mean P.2 mmHg  Ao V2 VTI: 81.2 cm  SINCERE(I,D): 0.79 cm2  SINCERE(V,D): 0.91 cm2  LV V1 max P.5 mmHg  LV V1 max: 106.5 cm/sec  LV V1 VTI: 21.4 cm  SV(LVOT): 64.3 ml  SI(LVOT): 31.2 ml/m2  TR max dru: 302.8 cm/sec  TR max P.7 mmHg  AV Dru Ratio (DI): 0.30  SINCERE Index (cm2/m2): 0.38  E/E': 41.3  E/E' av.8  Lateral E/e': 41.3  Medial E/e': 44.3     Peak E' Dru: 4.8 cm/sec  RV S Dru: 10.3 cm/sec     ______________________________________________________________________________  Report approved by: Chuck Yeh 2024 10:17 AM             Discharge Medications   Discharge Medication  List as of 2/5/2024  3:48 PM        START taking these medications    Details   apixaban ANTICOAGULANT (ELIQUIS) 5 MG tablet Take 1 tablet (5 mg) by mouth 2 times daily, Disp-60 tablet, R-0, E-Prescribe      digoxin (LANOXIN) 125 MCG tablet Take 1 tablet (125 mcg) by mouth daily, Disp-30 tablet, R-0, E-Prescribe      metoprolol tartrate 75 MG TABS Take 150 mg by mouth 2 times daily, Disp-60 tablet, R-0, E-Prescribe           CONTINUE these medications which have NOT CHANGED    Details   atorvastatin (LIPITOR) 40 MG tablet [ATORVASTATIN (LIPITOR) 40 MG TABLET] TAKE ONE TABLET BY MOUTH DAILY AT BEDTIME, Disp-90 tablet, R-1, NormalDue for office visit.      cyanocobalamin (VITAMIN B-12) 100 MCG tablet Take 100 mcg by mouth daily, Historical      fluticasone (FLONASE) 50 MCG/ACT nasal spray Spray 1 spray into both nostrils 2 times daily, Historical      losartan (COZAAR) 50 MG tablet Take 50 mg by mouth every evening, Historical      metFORMIN (GLUCOPHAGE XR) 500 MG 24 hr tablet Take 500 mg by mouth 2 times daily (with meals), Historical      multivitamin (CENTRUM SILVER) tablet Take 1 tablet by mouth daily, Historical      PROAIR HFA 90 mcg/actuation inhaler [PROAIR HFA 90 MCG/ACTUATION INHALER] INHALE TWO PUFFS BY MOUTH EVERY 4 TO 6 HOURS AS NEEDED - MAX OF 12 PUFFS IN 24 HOURS, Disp-8.5 g, R-5, Normal      Vitamin D3 (CHOLECALCIFEROL) 25 mcg (1000 units) tablet Take 25 mcg by mouth daily, Historical           Allergies   No Known Allergies

## 2024-02-08 ENCOUNTER — TELEPHONE (OUTPATIENT)
Dept: PULMONOLOGY | Facility: CLINIC | Age: 84
End: 2024-02-08
Payer: COMMERCIAL

## 2024-02-08 NOTE — TELEPHONE ENCOUNTER
Left Voicemail (2nd Attempt) for the patient to call back and schedule the following:    Appointment type: Npulm  Provider: none  Return date: next avail  Specialty phone number: [phone]  Additional appointment(s) needed: full pft  Additonal Notes: na

## 2024-02-13 ENCOUNTER — TELEPHONE (OUTPATIENT)
Dept: CARDIOLOGY | Facility: CLINIC | Age: 84
End: 2024-02-13
Payer: COMMERCIAL

## 2024-02-13 DIAGNOSIS — I35.0 AORTIC STENOSIS: Primary | ICD-10-CM

## 2024-02-13 NOTE — TELEPHONE ENCOUNTER
----- Message from Essie Carrero sent at 2/13/2024 10:22 AM CST -----  Valve referral from [facility]?

## 2024-02-13 NOTE — TELEPHONE ENCOUNTER
Inpatient referral to valve clinic for aortic stenosis per Dr. Rico on 2/4/24. Ok for cta before consult.    Thanks,  Flora

## 2024-02-26 ENCOUNTER — TELEPHONE (OUTPATIENT)
Dept: CARDIOLOGY | Facility: CLINIC | Age: 84
End: 2024-02-26
Payer: COMMERCIAL

## 2024-02-26 NOTE — TELEPHONE ENCOUNTER
[facility] Call Center    Phone Message    May a detailed message be left on voicemail: yes     Reason for Call: Other: Please call pt to schedule New TAVR and CT angiogram     Action Taken: Other: cardio    Travel Screening: Not Applicable    Thank you!  Specialty Access Center

## 2024-02-26 NOTE — TELEPHONE ENCOUNTER
Patient contacted by scheduling supervisor. Patient declined to schedule any follow up appts with the heart care team including valve clinic consultation. Scheduling supervisor explained we will be unable to refill any Rx's provided to her during her hospitalization as she has not been seen by any of the providers at this clinic and patient will need to have refills placed by her PCP whom she saw recently.     Veronica Hill RN on 2/26/2024 at 3:33 PM

## 2024-02-26 NOTE — TELEPHONE ENCOUNTER
[facility] Call Center    Phone Message    May a detailed message be left on voicemail: yes     Reason for Call: Other: Patient called to discuss a TAVR appt and refills. Patient states she already had a CT test done at the hospital. Please call patient back to further discuss.     Action Taken: Message routed to:  Other: Cardiology    Travel Screening: Not Applicable    Thank you!  Specialty Access Center

## 2024-02-26 NOTE — TELEPHONE ENCOUNTER
Contacted patient at 350 PM. Reiterated conversation that was had with out scheduling supervisor. Explained to patient that we cannot refill her prescriptions because she has never been seen by our providers and that she would need to call her primary care doctors office. Patient said she contacted them and is waiting to hear back. Patient stated she had CTA TAVR done already while hospitalized, discussed multiple times with patient that she had a CT scan with contrast but not a TAVR CTA which is a specific type of test. Patient verbalized understanding and indicated she has no one to bring her to appointments so she does not wish to schedule her TAVR CTA or Valve clinic consultation at this time. She noted she would call back if she wants to schedule later.     Veronica Hill RN on 2/26/2024 at 3:57 PM

## 2024-02-26 NOTE — TELEPHONE ENCOUNTER
[facility] Call Center    Phone Message    May a detailed message be left on voicemail: yes     Reason for Call: Medication Refill Request    Has the patient contacted the pharmacy for the refill? Yes   Name of medication being requested: digoxin (LANOXIN) 125 MCG tablet   metoprolol tartrate 75 MG TABS   apixaban ANTICOAGULANT (ELIQUIS) 5 MG tablet    Provider who prescribed the medication:     Avi Ramires MD     Pharmacy:   [address] [place] [address]     Date medication is needed: asap       Action Taken: Other: cardio    Travel Screening: Not Applicable    Thank you!  Specialty Access Center

## 2024-03-12 NOTE — TELEPHONE ENCOUNTER
See other phone encounters regarding conversations with patient and attempt to schedule on 2/26/24. Request sent to scheduling team this week to reach out to patient again to see if she would be interested in scheduling at this time.     Veronica Hill RN on 3/12/2024 at 1:17 PM

## 2024-03-18 ENCOUNTER — TELEPHONE (OUTPATIENT)
Dept: ADMINISTRATIVE | Facility: CLINIC | Age: 84
End: 2024-03-18
Payer: COMMERCIAL

## 2024-03-18 NOTE — TELEPHONE ENCOUNTER
Will continue to watch patient and what comes of 4/12 visit if she does not return calls.      ----- Message from Nataliya Collazo sent at 3/15/2024  2:23 PM CDT -----  Regarding: JAKE Fountain,     I have not been able to reach this pt since your vm on 3/11. I see pt has an appt with EP MD on 4/12 as well hopefully he will speak to her/refer her to valve clinic as well.     Thank you,  Nataliya   ----- Message -----  From: Veronica Hill RN  Sent: 3/11/2024   1:42 PM CDT  To: [facility] Procedure  Pool - Lhe  Subject: one more try                                     Hi Ladies,   Could you try reaching out to this patient again to see if she is interested in being seen per the referral from Dr. Rico when she was hospitalized? Orders placed for a CTA to be done prior if she is willing. She was upset with us last time the team contacted because she wanted refills on cardiology medications when she had not been seen by anyone in the clinic. We discussed this needed to go through her PCP who she had just seen. She said she was too overwhelmed to make the appts at the time and I told her we would reach out later or she could if she wanted.     MANOJ Villegas